# Patient Record
Sex: MALE | Race: WHITE | NOT HISPANIC OR LATINO | Employment: OTHER | URBAN - METROPOLITAN AREA
[De-identification: names, ages, dates, MRNs, and addresses within clinical notes are randomized per-mention and may not be internally consistent; named-entity substitution may affect disease eponyms.]

---

## 2023-04-09 PROCEDURE — G0179 MD RECERTIFICATION HHA PT: HCPCS | Performed by: INTERNAL MEDICINE

## 2023-07-24 ENCOUNTER — OFFICE VISIT (OUTPATIENT)
Dept: PRIMARY CARE | Facility: CLINIC | Age: 78
End: 2023-07-24
Payer: MEDICARE

## 2023-07-24 VITALS — BODY MASS INDEX: 32.76 KG/M2 | TEMPERATURE: 97.7 F | WEIGHT: 178 LBS | HEIGHT: 62 IN

## 2023-07-24 DIAGNOSIS — E78.5 HYPERLIPIDEMIA, UNSPECIFIED HYPERLIPIDEMIA TYPE: ICD-10-CM

## 2023-07-24 DIAGNOSIS — R53.83 MALAISE AND FATIGUE: ICD-10-CM

## 2023-07-24 DIAGNOSIS — E11.9 DIABETES TYPE 2, NO OCULAR INVOLVEMENT (MULTI): Primary | ICD-10-CM

## 2023-07-24 DIAGNOSIS — C18.9 MALIGNANT NEOPLASM OF COLON, UNSPECIFIED PART OF COLON (MULTI): ICD-10-CM

## 2023-07-24 DIAGNOSIS — R53.81 MALAISE AND FATIGUE: ICD-10-CM

## 2023-07-24 DIAGNOSIS — E11.69 TYPE 2 DIABETES MELLITUS WITH OTHER SPECIFIED COMPLICATION, WITHOUT LONG-TERM CURRENT USE OF INSULIN (MULTI): ICD-10-CM

## 2023-07-24 DIAGNOSIS — E55.9 VITAMIN D DEFICIENCY: ICD-10-CM

## 2023-07-24 LAB
CARCINOEMBRYONIC AG (NG/ML) IN SER/PLAS: 0.7 UG/L
CREAT UR STRIP-MCNC: 200 MG/DL
MICROALBUMIN UR TEST STR-MCNC: 30 MG/L
PROT/CREAT UR: <30 UG/MG CREAT

## 2023-07-24 PROCEDURE — 1159F MED LIST DOCD IN RCRD: CPT | Performed by: INTERNAL MEDICINE

## 2023-07-24 PROCEDURE — 99213 OFFICE O/P EST LOW 20 MIN: CPT | Performed by: INTERNAL MEDICINE

## 2023-07-24 PROCEDURE — 80053 COMPREHEN METABOLIC PANEL: CPT | Performed by: INTERNAL MEDICINE

## 2023-07-24 PROCEDURE — 84443 ASSAY THYROID STIM HORMONE: CPT | Performed by: INTERNAL MEDICINE

## 2023-07-24 PROCEDURE — 82570 ASSAY OF URINE CREATININE: CPT | Performed by: INTERNAL MEDICINE

## 2023-07-24 PROCEDURE — 1126F AMNT PAIN NOTED NONE PRSNT: CPT | Performed by: INTERNAL MEDICINE

## 2023-07-24 PROCEDURE — 82306 VITAMIN D 25 HYDROXY: CPT | Performed by: INTERNAL MEDICINE

## 2023-07-24 PROCEDURE — 1160F RVW MEDS BY RX/DR IN RCRD: CPT | Performed by: INTERNAL MEDICINE

## 2023-07-24 PROCEDURE — 83036 HEMOGLOBIN GLYCOSYLATED A1C: CPT | Performed by: INTERNAL MEDICINE

## 2023-07-24 PROCEDURE — 82378 CARCINOEMBRYONIC ANTIGEN: CPT

## 2023-07-24 PROCEDURE — 82043 UR ALBUMIN QUANTITATIVE: CPT | Performed by: INTERNAL MEDICINE

## 2023-07-24 PROCEDURE — 1036F TOBACCO NON-USER: CPT | Performed by: INTERNAL MEDICINE

## 2023-07-24 PROCEDURE — 85025 COMPLETE CBC W/AUTO DIFF WBC: CPT | Performed by: INTERNAL MEDICINE

## 2023-07-24 RX ORDER — BRIMONIDINE TARTRATE 2 MG/ML
1 SOLUTION/ DROPS OPHTHALMIC 2 TIMES DAILY
COMMUNITY

## 2023-07-24 RX ORDER — HYDROCORTISONE 25 MG/G
CREAM TOPICAL
COMMUNITY
Start: 2020-06-24

## 2023-07-24 RX ORDER — METOPROLOL TARTRATE 25 MG/1
25 TABLET, FILM COATED ORAL 2 TIMES DAILY
COMMUNITY
End: 2023-11-21 | Stop reason: SDUPTHER

## 2023-07-24 RX ORDER — MONTELUKAST SODIUM 10 MG/1
10 TABLET ORAL NIGHTLY
COMMUNITY

## 2023-07-24 RX ORDER — LATANOPROST 50 UG/ML
SOLUTION/ DROPS OPHTHALMIC
COMMUNITY

## 2023-07-24 RX ORDER — ATORVASTATIN CALCIUM 40 MG/1
40 TABLET, FILM COATED ORAL DAILY
COMMUNITY
End: 2023-09-20 | Stop reason: SDUPTHER

## 2023-07-24 RX ORDER — FINASTERIDE 5 MG/1
5 TABLET, FILM COATED ORAL DAILY
COMMUNITY
End: 2023-09-20 | Stop reason: SDUPTHER

## 2023-07-24 RX ORDER — BUDESONIDE AND FORMOTEROL FUMARATE DIHYDRATE 160; 4.5 UG/1; UG/1
2 AEROSOL RESPIRATORY (INHALATION) 2 TIMES DAILY
COMMUNITY

## 2023-07-24 RX ORDER — TAMSULOSIN HYDROCHLORIDE 0.4 MG/1
0.4 CAPSULE ORAL EVERY EVENING
COMMUNITY
End: 2023-09-20 | Stop reason: SDUPTHER

## 2023-07-24 RX ORDER — METOPROLOL TARTRATE 50 MG/1
25 TABLET ORAL 2 TIMES DAILY
COMMUNITY
Start: 2014-09-29 | End: 2023-11-21 | Stop reason: SDUPTHER

## 2023-07-24 RX ORDER — METFORMIN HYDROCHLORIDE 1000 MG/1
1000 TABLET ORAL 2 TIMES DAILY
COMMUNITY
End: 2023-09-20 | Stop reason: SDUPTHER

## 2023-07-24 RX ORDER — AMMONIUM LACTATE 12 G/100G
CREAM TOPICAL
COMMUNITY

## 2023-07-24 RX ORDER — ALBUTEROL SULFATE 90 UG/1
AEROSOL, METERED RESPIRATORY (INHALATION)
COMMUNITY

## 2023-07-24 RX ORDER — CICLOPIROX 7.7 MG/G
GEL TOPICAL
COMMUNITY
Start: 2022-06-30

## 2023-07-24 RX ORDER — VALSARTAN 80 MG/1
80 TABLET ORAL DAILY
COMMUNITY
End: 2023-09-20 | Stop reason: SDUPTHER

## 2023-07-24 RX ORDER — CLOTRIMAZOLE 1 %
CREAM (GRAM) TOPICAL
COMMUNITY
Start: 2022-12-28

## 2023-07-24 RX ORDER — GLIMEPIRIDE 4 MG/1
4 TABLET ORAL 2 TIMES DAILY
COMMUNITY
End: 2023-09-20 | Stop reason: SDUPTHER

## 2023-07-24 ASSESSMENT — PAIN SCALES - GENERAL: PAINLEVEL: 0-NO PAIN

## 2023-07-24 ASSESSMENT — COLUMBIA-SUICIDE SEVERITY RATING SCALE - C-SSRS
2. HAVE YOU ACTUALLY HAD ANY THOUGHTS OF KILLING YOURSELF?: NO
6. HAVE YOU EVER DONE ANYTHING, STARTED TO DO ANYTHING, OR PREPARED TO DO ANYTHING TO END YOUR LIFE?: NO
1. IN THE PAST MONTH, HAVE YOU WISHED YOU WERE DEAD OR WISHED YOU COULD GO TO SLEEP AND NOT WAKE UP?: NO

## 2023-07-24 ASSESSMENT — ENCOUNTER SYMPTOMS
OCCASIONAL FEELINGS OF UNSTEADINESS: 0
DEPRESSION: 0
LOSS OF SENSATION IN FEET: 0

## 2023-07-24 ASSESSMENT — PATIENT HEALTH QUESTIONNAIRE - PHQ9
SUM OF ALL RESPONSES TO PHQ9 QUESTIONS 1 AND 2: 0
1. LITTLE INTEREST OR PLEASURE IN DOING THINGS: NOT AT ALL
2. FEELING DOWN, DEPRESSED OR HOPELESS: NOT AT ALL

## 2023-07-24 NOTE — PROGRESS NOTES
Subjective    Eliceo Murphy is a 78 y.o. male who presents for  follow up.  Weak.  Has malaise.  Patient has difficulty to ambulate.  Using cane.    HPI    This is a 78 years old gentleman with medical history of hypertension, hyperlipidemia, gastroesophageal reflux disease, diabetes type 2, postherpetic neuralgia, history of colon CA 2013, S/p colectomy, history of CVA with right sided hemiparesis, dysarthria, asthma, COPD.  Patient presented for follow-up.  He is in the room with his daughter and wife present.  According to the daughter, patient is taking medications as directed.  Stated, that has been having minimal ambulation.  Using cane.  Weak.    Encouraged to exercise more.  Patient is interested to have his blood work done.  No shortness of breath or chest pain.  Does not check his blood glucose.     Still has weakness of his right side.  Taking medications as directed.  Blood pressure is stable.     Review of Systems   Constitutional:  Positive for activity change.   Cardiovascular:  Positive for chest pain.   Musculoskeletal:  Positive for arthralgias and back pain.   Neurological:  Positive for weakness.   Psychiatric/Behavioral:  The patient is nervous/anxious.        Objective        Vitals:    07/24/23 1442   Temp: 36.5 °C (97.7 °F)        Physical Exam  Constitutional:       Appearance: Normal appearance.   HENT:      Head: Normocephalic.      Nose: Nose normal.   Cardiovascular:      Rate and Rhythm: Regular rhythm.      Pulses: Normal pulses.   Abdominal:      Palpations: Abdomen is soft.   Musculoskeletal:         General: Normal range of motion.      Cervical back: Normal range of motion.   Skin:     General: Skin is warm.   Neurological:      General: No focal deficit present.      Mental Status: He is alert.         Diabetic  foot exam:  Good pulses, intact skin.    Diagnoses and all orders for this visit:  Diabetes type 2, no ocular involvement (CMS/HCC) (Primary)  -     POCT ALBUMIN RANDOM  URINE DOCKED DEVICE  -     Hemoglobin A1C  Hyperlipidemia, unspecified hyperlipidemia type  -     Comprehensive Metabolic Panel  Malignant neoplasm of colon, unspecified part of colon (CMS/HCC)  -     Carcinoembryonic Antigen  Type 2 diabetes mellitus with other specified complication, without long-term current use of insulin (CMS/HCC)  -     Cancel: POCT ALBUMIN RANDOM URINE DOCKED DEVICE  Malaise and fatigue  -     CBC  -     Thyroid Stimulating Hormone  Vitamin D deficiency  -     Vitamin D, Total  Other orders  -     POCT MICROALBUMIN RANDOM URINE  -     POCT MICROALBUMIN RANDOM URINE  -     POCT URINALYSIS AUTOMATED     -Diabetes type 2.  Keep 1800 ADA low-sodium low-cholesterol diet.  Taking glimepiride, metformin.  BW with hemoglobin A1c, urine microalbumin.  Requisition is given to the patient.     - HTN.  Well controlled.  Continue to take Current medications.  Avoid salt, exercise.     -Hyperlipidemia.  Continue to take atorvastatin.  Keep Mediterranean diet, exercise.  Return for fasting blood work.  Your LDL has to be less than 70.     - Asthma.  Followed by pulmonary.  Continuous cardiorespiratory rehabilitation.  Continue to use albuterol, Symbicort.  Using prednisone with exacerbation.     -BPH.  Continue to take finasteride, tamsulosin.  Follow-up with urology.     -History of metastatic colon CA 2013.  Patient had surgery done at [facility].  Will obtain all records.  In need to repeat colonoscopy.  CEA is today.     -Health maintenance.  Medicare wellness annual exam is up to date.  All vaccination is up-to-date.  Colonoscopy record is pending.     - S/p CVA with right-sided weakness, dysphagia.  Patient had slow recovery.  Encourage ambulation, exercise.     - Class 1 Obesity with 32.56  Diet, exercise.  Ideal BMI is less than 25.   opth 8/2023     Holly Mcarthur MD

## 2023-07-25 LAB
APPEARANCE UR: CLEAR
BILIRUB UR QL STRIP: NEGATIVE
COLOR UR: YELLOW
CREAT UR STRIP-MCNC: 50 MG/DL
GLUCOSE UR STRIP-MCNC: NEGATIVE MG/DL
HGB UR QL STRIP: NEGATIVE
KETONES UR STRIP-MCNC: NEGATIVE MG/DL
LEUKOCYTE ESTERASE UR QL STRIP: NEGATIVE
MICROALBUMIN UR TEST STR-MCNC: 30 MG/L
NITRITE UR QL STRIP: NEGATIVE
PH UR STRIP: 6 [PH]
PROT UR STRIP-MCNC: NEGATIVE MG/DL
PROT/CREAT UR: ABNORMAL UG/MG CREAT
SP GR UR STRIP.AUTO: 1.02
UROBILINOGEN UR STRIP-ACNC: 0.2 E.U./DL

## 2023-07-25 PROCEDURE — 82570 ASSAY OF URINE CREATININE: CPT | Performed by: INTERNAL MEDICINE

## 2023-07-25 PROCEDURE — 82043 UR ALBUMIN QUANTITATIVE: CPT | Performed by: INTERNAL MEDICINE

## 2023-07-25 PROCEDURE — 81003 URINALYSIS AUTO W/O SCOPE: CPT | Performed by: INTERNAL MEDICINE

## 2023-07-25 ASSESSMENT — ENCOUNTER SYMPTOMS
ARTHRALGIAS: 1
BACK PAIN: 1
NERVOUS/ANXIOUS: 1
ACTIVITY CHANGE: 1
WEAKNESS: 1

## 2023-08-07 PROCEDURE — G0179 MD RECERTIFICATION HHA PT: HCPCS | Performed by: INTERNAL MEDICINE

## 2023-09-19 ENCOUNTER — TELEPHONE (OUTPATIENT)
Dept: PRIMARY CARE | Facility: CLINIC | Age: 78
End: 2023-09-19

## 2023-09-19 NOTE — TELEPHONE ENCOUNTER
Medication refill  Finasterde- mg 5 - 90 quantity 3 refill  Atorvastatin- 40 mg- 90 days  Glimepirde- 4mg -90 days   Tarmsolosin .4 mg 90 days  Valsartan- 80 mg -90 days  Metformin- 1000mg - 90 days  Januvia- 100mg, 90 days  [facility]- [phone]   Pharmacy they sent this list in twice nothing was refilled or sent to [facility]

## 2023-09-20 DIAGNOSIS — I10 PRIMARY HYPERTENSION: ICD-10-CM

## 2023-09-20 DIAGNOSIS — N40.0 BENIGN PROSTATIC HYPERPLASIA, UNSPECIFIED WHETHER LOWER URINARY TRACT SYMPTOMS PRESENT: ICD-10-CM

## 2023-09-20 DIAGNOSIS — E78.5 HYPERLIPIDEMIA, UNSPECIFIED HYPERLIPIDEMIA TYPE: ICD-10-CM

## 2023-09-20 DIAGNOSIS — E11.9 DIABETES TYPE 2, NO OCULAR INVOLVEMENT (MULTI): Primary | ICD-10-CM

## 2023-09-20 RX ORDER — VALSARTAN 80 MG/1
80 TABLET ORAL DAILY
Qty: 90 TABLET | Refills: 2 | Status: SHIPPED | OUTPATIENT
Start: 2023-09-20 | End: 2024-04-05

## 2023-09-20 RX ORDER — ATORVASTATIN CALCIUM 40 MG/1
40 TABLET, FILM COATED ORAL DAILY
Qty: 90 TABLET | Refills: 2 | Status: SHIPPED | OUTPATIENT
Start: 2023-09-20 | End: 2024-04-05

## 2023-09-20 RX ORDER — TAMSULOSIN HYDROCHLORIDE 0.4 MG/1
0.4 CAPSULE ORAL EVERY EVENING
Qty: 90 CAPSULE | Refills: 2 | Status: SHIPPED | OUTPATIENT
Start: 2023-09-20 | End: 2023-12-19

## 2023-09-20 RX ORDER — FINASTERIDE 5 MG/1
5 TABLET, FILM COATED ORAL DAILY
Qty: 90 TABLET | Refills: 3 | Status: SHIPPED | OUTPATIENT
Start: 2023-09-20 | End: 2023-12-19

## 2023-09-20 RX ORDER — GLIMEPIRIDE 4 MG/1
4 TABLET ORAL 2 TIMES DAILY
Qty: 180 TABLET | Refills: 2 | Status: SHIPPED | OUTPATIENT
Start: 2023-09-20 | End: 2024-04-05

## 2023-09-20 RX ORDER — METFORMIN HYDROCHLORIDE 1000 MG/1
1000 TABLET ORAL 2 TIMES DAILY
Qty: 180 TABLET | Refills: 3 | Status: SHIPPED | OUTPATIENT
Start: 2023-09-20 | End: 2024-04-05

## 2023-10-06 PROCEDURE — G0179 MD RECERTIFICATION HHA PT: HCPCS | Performed by: INTERNAL MEDICINE

## 2023-10-30 ENCOUNTER — OFFICE VISIT (OUTPATIENT)
Dept: PRIMARY CARE | Facility: CLINIC | Age: 78
End: 2023-10-30
Payer: MEDICARE

## 2023-10-30 VITALS
SYSTOLIC BLOOD PRESSURE: 122 MMHG | DIASTOLIC BLOOD PRESSURE: 62 MMHG | BODY MASS INDEX: 32.01 KG/M2 | HEART RATE: 64 BPM | TEMPERATURE: 96.8 F | WEIGHT: 175 LBS | RESPIRATION RATE: 16 BRPM

## 2023-10-30 DIAGNOSIS — R53.83 MALAISE AND FATIGUE: ICD-10-CM

## 2023-10-30 DIAGNOSIS — E11.9 DIABETES TYPE 2, NO OCULAR INVOLVEMENT (MULTI): Primary | ICD-10-CM

## 2023-10-30 DIAGNOSIS — R53.81 MALAISE AND FATIGUE: ICD-10-CM

## 2023-10-30 DIAGNOSIS — E78.5 HYPERLIPIDEMIA, UNSPECIFIED HYPERLIPIDEMIA TYPE: ICD-10-CM

## 2023-10-30 DIAGNOSIS — E55.9 VITAMIN D DEFICIENCY: ICD-10-CM

## 2023-10-30 PROCEDURE — G0008 ADMIN INFLUENZA VIRUS VAC: HCPCS | Performed by: INTERNAL MEDICINE

## 2023-10-30 PROCEDURE — 1159F MED LIST DOCD IN RCRD: CPT | Performed by: INTERNAL MEDICINE

## 2023-10-30 PROCEDURE — 99213 OFFICE O/P EST LOW 20 MIN: CPT | Performed by: INTERNAL MEDICINE

## 2023-10-30 PROCEDURE — 90662 IIV NO PRSV INCREASED AG IM: CPT | Performed by: INTERNAL MEDICINE

## 2023-10-30 PROCEDURE — 1160F RVW MEDS BY RX/DR IN RCRD: CPT | Performed by: INTERNAL MEDICINE

## 2023-10-30 PROCEDURE — 1036F TOBACCO NON-USER: CPT | Performed by: INTERNAL MEDICINE

## 2023-10-30 PROCEDURE — 1126F AMNT PAIN NOTED NONE PRSNT: CPT | Performed by: INTERNAL MEDICINE

## 2023-10-30 RX ORDER — DORZOLAMIDE HYDROCHLORIDE AND TIMOLOL MALEATE 20; 5 MG/ML; MG/ML
1 SOLUTION/ DROPS OPHTHALMIC 2 TIMES DAILY
COMMUNITY
Start: 2023-10-20

## 2023-10-30 RX ORDER — ASPIRIN 81 MG/1
81 TABLET ORAL
COMMUNITY

## 2023-10-30 ASSESSMENT — PAIN SCALES - GENERAL: PAINLEVEL: 0-NO PAIN

## 2023-10-30 ASSESSMENT — ENCOUNTER SYMPTOMS
LIGHT-HEADEDNESS: 1
APPETITE CHANGE: 1
ACTIVITY CHANGE: 1
CHEST TIGHTNESS: 0
SHORTNESS OF BREATH: 0
BACK PAIN: 1

## 2023-10-30 NOTE — PROGRESS NOTES
Subjective    Eliceo Murphy is a 78 y.o. male who presents for  follow up.  Weak.  Has unsteady gait, using cane.    HPI    This is a 78 years old gentleman with medical history of hypertension, hyperlipidemia, gastroesophageal reflux disease, diabetes type 2, postherpetic neuralgia, history of colon CA 2013, S/p colectomy, history of CVA with right sided hemiparesis, dysarthria, asthma, COPD.  Patient is presented for follow-up.  Has unsteady gait, using cane.   Has minimal ambulation.  Using cane.  Weak.   Encouraged to exercise more.  No shortness of breath or chest pain.  Does not check his blood glucose.     Still has weakness of his right side.  Taking medications as directed.  Blood pressure is stable.     Review of Systems   Constitutional:  Positive for activity change and appetite change.   HENT:  Negative for congestion.    Respiratory:  Negative for chest tightness and shortness of breath.    Musculoskeletal:  Positive for back pain.   Neurological:  Positive for light-headedness.       Objective        Vitals:    10/30/23 1552   Temp: 36 °C (96.8 °F)        Physical Exam  HENT:      Head: Normocephalic.      Nose: Nose normal.   Cardiovascular:      Pulses: Normal pulses.   Pulmonary:      Breath sounds: Normal breath sounds.   Abdominal:      Palpations: Abdomen is soft.   Musculoskeletal:         General: Tenderness present.   Skin:     General: Skin is warm.   Neurological:      General: No focal deficit present.      Mental Status: He is alert. Mental status is at baseline.       Diagnoses and all orders for this visit:  Diabetes type 2, no ocular involvement (CMS/[facility]) (Primary)  Hyperlipidemia, unspecified hyperlipidemia type  Malaise and fatigue  Vitamin D deficiency  Other orders  -     Flu vaccine, quadrivalent, high-dose, preservative free, age 65y+ (FLUZONE)     -Diabetes type 2.  Keep 1800 ADA low-sodium low-cholesterol diet.  Taking glimepiride, metformin.   Return for fasting blood work.      - HTN.  Well controlled.  Continue to take Current medications.  Avoid salt, exercise.     -Hyperlipidemia.  Continue to take atorvastatin.  Keep Mediterranean diet, exercise.  Return for fasting blood work.  Your LDL has to be less than 70.     - Asthma.  Followed by pulmonary.  Continuous cardiorespiratory rehabilitation.  Continue to use albuterol, Symbicort.  Using prednisone with exacerbation.     -BPH.  Continue to take finasteride, tamsulosin.  Follow-up with urology.     -History of metastatic colon CA 2013.  Patient had surgery done at [facility].  Will obtain all records.  In need to repeat colonoscopy.  CEA is today.     -Health maintenance.  Medicare wellness annual exam is up to date.  All vaccination is up-to-date.  Colonoscopy record is pending.     - S/p CVA with right-sided weakness, dysphagia.  Patient had slow recovery.  Encourage ambulation, exercise.     - Class 1 Obesity with 32.01  Diet, exercise.  Ideal BMI is less than 25.   opth 8/2023      Holly Mcarthur MD

## 2023-10-30 NOTE — PROGRESS NOTES
Subjective    Eliceo Murphy is a 78 y.o. male who presents for        HPI  This is a 78 years old gentleman with medical history of hypertension, hyperlipidemia, gastroesophageal reflux disease, diabetes type 2, postherpetic neuralgia, history of colon CA 2013, S/p colectomy, history of CVA with right sided hemiparesis, dysarthria, asthma, COPD.  Patient presented for follow-up.  He is in the room with his daughter and wife present.  According to the daughter, patient is taking medications as directed.  Stated, that has been having minimal ambulation.  Using cane.  Weak.     Encouraged to exercise more.  Patient is interested to have his blood work done.  No shortness of breath or chest pain.  Does not check his blood glucose.     Still has weakness of his right side.  Taking medications as directed.  Blood pressure is stable.     Review of Systems    Objective        Vitals:    10/30/23 1552   Temp: 36 °C (96.8 °F)        Physical Exam  There are no diagnoses linked to this encounter.       -Diabetes type 2.  Keep 1800 ADA low-sodium low-cholesterol diet.  Taking glimepiride, metformin.  BW with hemoglobin A1c, urine microalbumin.  Requisition is given to the patient.     - HTN.  Well controlled.  Continue to take Current medications.  Avoid salt, exercise.     -Hyperlipidemia.  Continue to take atorvastatin.  Keep Mediterranean diet, exercise.  Return for fasting blood work.  Your LDL has to be less than 70.     - Asthma.  Followed by pulmonary.  Continuous cardiorespiratory rehabilitation.  Continue to use albuterol, Symbicort.  Using prednisone with exacerbation.     -BPH.  Continue to take finasteride, tamsulosin.  Follow-up with urology.     -History of metastatic colon CA 2013.  Patient had surgery done at [facility].  Will obtain all records.  In need to repeat colonoscopy.  CEA is today.     -Health maintenance.  Medicare wellness annual exam is up to date.  All vaccination is up-to-date.  Colonoscopy  record is pending.     - S/p CVA with right-sided weakness, dysphagia.  Patient had slow recovery.  Encourage ambulation, exercise.     - Class 1 Obesity with 32.01  Diet, exercise.  Ideal BMI is less than 25.   opth 8/2023      Holly Mcarthur MD

## 2023-11-21 ENCOUNTER — TELEPHONE (OUTPATIENT)
Dept: PRIMARY CARE | Facility: CLINIC | Age: 78
End: 2023-11-21

## 2023-11-21 DIAGNOSIS — I10 HYPERTENSION, UNSPECIFIED TYPE: ICD-10-CM

## 2023-11-21 RX ORDER — METOPROLOL TARTRATE 25 MG/1
25 TABLET, FILM COATED ORAL 2 TIMES DAILY
Qty: 180 TABLET | Refills: 3 | Status: SHIPPED | OUTPATIENT
Start: 2023-11-21 | End: 2024-04-05

## 2023-11-21 RX ORDER — METOPROLOL TARTRATE 25 MG/1
25 TABLET, FILM COATED ORAL 2 TIMES DAILY
Qty: 180 TABLET | Refills: 0 | Status: SHIPPED | OUTPATIENT
Start: 2023-11-21 | End: 2023-11-21 | Stop reason: SDUPTHER

## 2023-11-24 ENCOUNTER — TELEPHONE (OUTPATIENT)
Dept: PHARMACY | Facility: HOSPITAL | Age: 78
End: 2023-11-24
Payer: MEDICARE

## 2023-11-24 NOTE — TELEPHONE ENCOUNTER
Population Health: Outreach by Ambulatory Pharmacy Team    Payor: Shemar BALLESTEROS  Reason: Adherence  Medication: Atorvastatin and valsartan  Outcome: Patient Reached: Claims Adherence, Spoke with daughter who states patient is adherent; gets 90 days ; looking at dispense history, patient has been on time with his last refills    Sandie ePrera, YinkaD

## 2024-02-03 PROCEDURE — G0179 MD RECERTIFICATION HHA PT: HCPCS | Performed by: INTERNAL MEDICINE

## 2024-03-22 ENCOUNTER — APPOINTMENT (OUTPATIENT)
Dept: PRIMARY CARE | Facility: CLINIC | Age: 79
End: 2024-03-22
Payer: MEDICARE

## 2024-04-03 PROCEDURE — G0179 MD RECERTIFICATION HHA PT: HCPCS | Performed by: INTERNAL MEDICINE

## 2024-04-05 ENCOUNTER — OFFICE VISIT (OUTPATIENT)
Dept: PRIMARY CARE | Facility: CLINIC | Age: 79
End: 2024-04-05
Payer: MEDICARE

## 2024-04-05 VITALS
HEIGHT: 61 IN | DIASTOLIC BLOOD PRESSURE: 62 MMHG | HEART RATE: 64 BPM | SYSTOLIC BLOOD PRESSURE: 132 MMHG | RESPIRATION RATE: 16 BRPM | BODY MASS INDEX: 32.47 KG/M2 | TEMPERATURE: 97.7 F | WEIGHT: 172 LBS

## 2024-04-05 DIAGNOSIS — E53.8 VITAMIN B12 DEFICIENCY: ICD-10-CM

## 2024-04-05 DIAGNOSIS — I63.9 RIGHT-SIDED CEREBROVASCULAR ACCIDENT (CVA) (MULTI): ICD-10-CM

## 2024-04-05 DIAGNOSIS — M54.50 LOW BACK PAIN, UNSPECIFIED BACK PAIN LATERALITY, UNSPECIFIED CHRONICITY, UNSPECIFIED WHETHER SCIATICA PRESENT: ICD-10-CM

## 2024-04-05 DIAGNOSIS — R53.83 MALAISE AND FATIGUE: ICD-10-CM

## 2024-04-05 DIAGNOSIS — N40.0 BENIGN PROSTATIC HYPERPLASIA, UNSPECIFIED WHETHER LOWER URINARY TRACT SYMPTOMS PRESENT: ICD-10-CM

## 2024-04-05 DIAGNOSIS — R53.81 MALAISE AND FATIGUE: ICD-10-CM

## 2024-04-05 DIAGNOSIS — E11.9 DIABETES TYPE 2, NO OCULAR INVOLVEMENT (MULTI): Primary | ICD-10-CM

## 2024-04-05 DIAGNOSIS — Z00.00 ROUTINE GENERAL MEDICAL EXAMINATION AT HEALTH CARE FACILITY: ICD-10-CM

## 2024-04-05 DIAGNOSIS — E55.9 VITAMIN D DEFICIENCY: ICD-10-CM

## 2024-04-05 DIAGNOSIS — E78.5 HYPERLIPIDEMIA, UNSPECIFIED HYPERLIPIDEMIA TYPE: ICD-10-CM

## 2024-04-05 PROCEDURE — 1160F RVW MEDS BY RX/DR IN RCRD: CPT | Performed by: INTERNAL MEDICINE

## 2024-04-05 PROCEDURE — 1157F ADVNC CARE PLAN IN RCRD: CPT | Performed by: INTERNAL MEDICINE

## 2024-04-05 PROCEDURE — 1036F TOBACCO NON-USER: CPT | Performed by: INTERNAL MEDICINE

## 2024-04-05 PROCEDURE — G0439 PPPS, SUBSEQ VISIT: HCPCS | Performed by: INTERNAL MEDICINE

## 2024-04-05 PROCEDURE — 99213 OFFICE O/P EST LOW 20 MIN: CPT | Performed by: INTERNAL MEDICINE

## 2024-04-05 PROCEDURE — 1159F MED LIST DOCD IN RCRD: CPT | Performed by: INTERNAL MEDICINE

## 2024-04-05 PROCEDURE — 1170F FXNL STATUS ASSESSED: CPT | Performed by: INTERNAL MEDICINE

## 2024-04-05 RX ORDER — FLASH GLUCOSE SENSOR
KIT MISCELLANEOUS
Qty: 1 EACH | Refills: 0 | Status: SHIPPED | OUTPATIENT
Start: 2024-04-05

## 2024-04-05 RX ORDER — FLASH GLUCOSE SCANNING READER
EACH MISCELLANEOUS
Qty: 1 EACH | Refills: 0 | Status: SHIPPED | OUTPATIENT
Start: 2024-04-05 | End: 2024-04-19

## 2024-04-05 ASSESSMENT — ACTIVITIES OF DAILY LIVING (ADL)
EATING: INDEPENDENT
FEEDING YOURSELF: NEEDS ASSISTANCE
DRESSING: INDEPENDENT
BATHING: NEEDS ASSISTANCE
PILL BOX USED: YES
ADEQUATE_TO_COMPLETE_ADL: YES
USING TELEPHONE: TOTAL CARE
JUDGMENT_ADEQUATE_SAFELY_COMPLETE_DAILY_ACTIVITIES: NO
JUDGMENT_ADEQUATE_SAFELY_COMPLETE_DAILY_ACTIVITIES: YES
GROOMING: INDEPENDENT
TAKING MEDICATION: TOTAL CARE
DOING HOUSEWORK: TOTAL CARE
ASSISTIVE_DEVICE: CANE;WALKER
TOILETING: NEEDS ASSISTANCE
DRESSING YOURSELF: INDEPENDENT
NEEDS ASSISTANCE WITH FOOD: INDEPENDENT
PREPARING MEALS: TOTAL CARE
STIL DRIVING: NO
HEARING - LEFT EAR: FUNCTIONAL
FEEDING: NEEDS ASSISTANCE
HEARING - RIGHT EAR: FUNCTIONAL
GROCERY SHOPPING: TOTAL CARE
TOILETING: INDEPENDENT
BATHING: NEEDS ASSISTANCE
WALKS IN HOME: INDEPENDENT
MANAGING FINANCES: TOTAL CARE
PATIENT'S MEMORY ADEQUATE TO SAFELY COMPLETE DAILY ACTIVITIES?: NO
USING TRANSPORTATION: TOTAL CARE
ADEQUATE_TO_COMPLETE_ADL: YES

## 2024-04-05 ASSESSMENT — ANXIETY QUESTIONNAIRES
6. BECOMING EASILY ANNOYED OR IRRITABLE: SEVERAL DAYS
7. FEELING AFRAID AS IF SOMETHING AWFUL MIGHT HAPPEN: SEVERAL DAYS
IF YOU CHECKED OFF ANY PROBLEMS ON THIS QUESTIONNAIRE, HOW DIFFICULT HAVE THESE PROBLEMS MADE IT FOR YOU TO DO YOUR WORK, TAKE CARE OF THINGS AT HOME, OR GET ALONG WITH OTHER PEOPLE: SOMEWHAT DIFFICULT
3. WORRYING TOO MUCH ABOUT DIFFERENT THINGS: SEVERAL DAYS
4. TROUBLE RELAXING: SEVERAL DAYS
1. FEELING NERVOUS, ANXIOUS, OR ON EDGE: SEVERAL DAYS
5. BEING SO RESTLESS THAT IT IS HARD TO SIT STILL: SEVERAL DAYS
GAD7 TOTAL SCORE: 7
2. NOT BEING ABLE TO STOP OR CONTROL WORRYING: SEVERAL DAYS

## 2024-04-05 ASSESSMENT — ENCOUNTER SYMPTOMS
LOSS OF SENSATION IN FEET: 1
ARTHRALGIAS: 1
DEPRESSION: 1
NERVOUS/ANXIOUS: 1
ABDOMINAL DISTENTION: 0
BACK PAIN: 1
ACTIVITY CHANGE: 1
APPETITE CHANGE: 1
OCCASIONAL FEELINGS OF UNSTEADINESS: 1
FATIGUE: 1

## 2024-04-05 ASSESSMENT — COLUMBIA-SUICIDE SEVERITY RATING SCALE - C-SSRS
1. IN THE PAST MONTH, HAVE YOU WISHED YOU WERE DEAD OR WISHED YOU COULD GO TO SLEEP AND NOT WAKE UP?: NO
6. HAVE YOU EVER DONE ANYTHING, STARTED TO DO ANYTHING, OR PREPARED TO DO ANYTHING TO END YOUR LIFE?: NO
2. HAVE YOU ACTUALLY HAD ANY THOUGHTS OF KILLING YOURSELF?: NO

## 2024-04-05 ASSESSMENT — GERIATRIC MINI NUTRITIONAL ASSESSMENT (MNA)
B WEIGHT LOSS DURING THE LAST 3 MONTHS: NO WEIGHT LOSS
C GENERAL MOBILITY: GOES OUT
A HAS FOOD INTAKE DECLINED OVER THE PAST 3 MONTHS DUE TO LOSS OF APPETITE, DIGESTIVE PROBLEMS, CHEWING OR SWALLOWING DIFFICULTIES?: NO DECREASE IN FOOD INTAKE

## 2024-04-05 ASSESSMENT — PATIENT HEALTH QUESTIONNAIRE - PHQ9
2. FEELING DOWN, DEPRESSED OR HOPELESS: SEVERAL DAYS
1. LITTLE INTEREST OR PLEASURE IN DOING THINGS: SEVERAL DAYS
10. IF YOU CHECKED OFF ANY PROBLEMS, HOW DIFFICULT HAVE THESE PROBLEMS MADE IT FOR YOU TO DO YOUR WORK, TAKE CARE OF THINGS AT HOME, OR GET ALONG WITH OTHER PEOPLE: SOMEWHAT DIFFICULT
SUM OF ALL RESPONSES TO PHQ9 QUESTIONS 1 AND 2: 2

## 2024-04-05 NOTE — PROGRESS NOTES
Subjective    Eliceo Murphy is a 79 y.o. male who presents for Medicare Wellness Annual exam.  Patient is weak, has minimal ambulation.  Using cane.  Interested to have BW done.    HPI    This is a 79 years old gentleman with medical history of hypertension, hyperlipidemia, gastroesophageal reflux disease, diabetes type 2, postherpetic neuralgia, history of colon CA 2013, S/p colectomy, history of CVA with right sided hemiparesis, dysarthria, asthma, COPD.  Patient is presented for follow-up.  He is here for Medicare Wellness Annual exam.  Blood glucose is not well controlled.    Has unsteady gait, using cane.   Has minimal ambulation.  Using cane.  Weak.   Encouraged to exercise more.  No shortness of breath or chest pain.  Does not check his blood glucose.     Still has weakness of his right side.  Taking medications as directed.  Blood pressure is stable.    Review of Systems   Constitutional:  Positive for activity change, appetite change and fatigue.   Gastrointestinal:  Negative for abdominal distention.   Musculoskeletal:  Positive for arthralgias and back pain.   Psychiatric/Behavioral:  The patient is nervous/anxious.        Objective        Vitals:    04/05/24 1417   BP: 132/62   Pulse: 64   Resp: 16   Temp: 36.5 °C (97.7 °F)        Physical Exam  HENT:      Head: Normocephalic.      Mouth/Throat:      Mouth: Mucous membranes are moist.   Eyes:      Extraocular Movements: Extraocular movements intact.   Cardiovascular:      Rate and Rhythm: Normal rate.      Heart sounds: Normal heart sounds.   Pulmonary:      Breath sounds: Normal breath sounds.   Abdominal:      Palpations: Abdomen is soft.   Musculoskeletal:         General: Normal range of motion.   Skin:     General: Skin is warm.   Neurological:      Mental Status: He is alert. Mental status is at baseline.   Psychiatric:         Mood and Affect: Mood normal.       Diagnoses and all orders for this visit:  Diabetes type 2, no ocular involvement  (CMS/[facility]) (Primary)  -     Hemoglobin A1C; Future  -     Hemoglobin A1C  Vitamin D deficiency  -     Vitamin D 25-Hydroxy,Total (for eval of Vitamin D levels); Future  -     Vitamin D 25-Hydroxy,Total (for eval of Vitamin D levels)  Malaise and fatigue  -     CBC; Future  -     Thyroid Stimulating Hormone; Future  -     CBC  -     Thyroid Stimulating Hormone  Hyperlipidemia, unspecified hyperlipidemia type  -     Comprehensive Metabolic Panel; Future  -     Lipid Panel; Future  -     Comprehensive Metabolic Panel  -     Lipid Panel  Benign prostatic hyperplasia, unspecified whether lower urinary tract symptoms present  -     Prostate Specific Antigen; Future  -     Prostate Specific Antigen  Vitamin B12 deficiency  -     Vitamin B12; Future  -     Vitamin B12  Low back pain, unspecified back pain laterality, unspecified chronicity, unspecified whether sciatica present  Right-sided cerebrovascular accident (CVA) (CMS/[facility])  -     Referral to Physical Therapy; Future  Routine general medical examination at health care facility  -     1 Year Follow Up In Primary Care - Wellness Exam; Future       -Diabetes type 2.  Keep 1800 ADA low-sodium low-cholesterol diet.  Taking glimepiride, metformin.   Return for fasting blood work.     - HTN.  Well controlled.  Continue to take Current medications.  Avoid salt, exercise.     -Hyperlipidemia.  Continue to take atorvastatin.  Keep Mediterranean diet, exercise.  Return for fasting blood work.  Your LDL has to be less than 70.     - Asthma.  Followed by pulmonary.  Continuous cardiorespiratory rehabilitation.  Continue to use albuterol, Symbicort.  Using prednisone with exacerbation.     -BPH.  Continue to take finasteride, tamsulosin.  Follow-up with urology.     -History of metastatic colon CA 2013.  Patient had surgery done at [facility].  Will obtain all records.  In need to repeat colonoscopy.  CEA is today.     -Health maintenance.  Medicare wellness annual exam is   today.  All vaccination is up-to-date.  Colonoscopy record is pending.     - S/p CVA with right-sided weakness, dysphagia.  Patient had slow recovery.  Encourage ambulation, exercise.     - Class 1 Obesity with 32.50  Diet, exercise.  Ideal BMI is less than 25.      Holly Mcarthur MD

## 2024-04-18 DIAGNOSIS — E11.9 DIABETES TYPE 2, NO OCULAR INVOLVEMENT (MULTI): ICD-10-CM

## 2024-04-18 DIAGNOSIS — E03.9 HYPOTHYROIDISM, UNSPECIFIED TYPE: Primary | ICD-10-CM

## 2024-04-19 RX ORDER — FLASH GLUCOSE SCANNING READER
EACH MISCELLANEOUS
Qty: 2 EACH | Refills: 0 | Status: SHIPPED | OUTPATIENT
Start: 2024-04-19

## 2024-04-24 RX ORDER — DORZOLAMIDE HCL 20 MG/ML
1 SOLUTION/ DROPS OPHTHALMIC 2 TIMES DAILY
COMMUNITY
Start: 2024-03-13

## 2024-04-24 RX ORDER — TIMOLOL MALEATE 5 MG/ML
1 SOLUTION/ DROPS OPHTHALMIC 2 TIMES DAILY
COMMUNITY
Start: 2024-03-13

## 2024-04-24 RX ORDER — PREDNISOLONE ACETATE 10 MG/ML
1 SUSPENSION/ DROPS OPHTHALMIC 4 TIMES DAILY
COMMUNITY
Start: 2024-04-22

## 2024-04-24 RX ORDER — TRIAMCINOLONE ACETONIDE 1 MG/G
CREAM TOPICAL
COMMUNITY
Start: 2024-03-27

## 2024-04-24 RX ORDER — TAMSULOSIN HYDROCHLORIDE 0.4 MG/1
0.4 CAPSULE ORAL NIGHTLY
COMMUNITY
Start: 2024-03-27

## 2024-04-29 ENCOUNTER — APPOINTMENT (OUTPATIENT)
Dept: PRIMARY CARE | Facility: CLINIC | Age: 79
End: 2024-04-29
Payer: MEDICARE

## 2024-04-29 RX ORDER — LEVOTHYROXINE SODIUM 25 UG/1
25 TABLET ORAL DAILY
Qty: 90 TABLET | Refills: 2 | Status: SHIPPED | OUTPATIENT
Start: 2024-04-29 | End: 2024-07-28

## 2024-05-13 ENCOUNTER — APPOINTMENT (OUTPATIENT)
Dept: PRIMARY CARE | Facility: CLINIC | Age: 79
End: 2024-05-13
Payer: MEDICARE

## 2024-06-02 PROCEDURE — G0179 MD RECERTIFICATION HHA PT: HCPCS | Performed by: INTERNAL MEDICINE

## 2024-06-10 RX ORDER — MELOXICAM 15 MG/1
15 TABLET ORAL
Qty: 30 TABLET | Refills: 0 | OUTPATIENT
Start: 2024-06-10

## 2024-06-10 RX ORDER — MELOXICAM 15 MG/1
15 TABLET ORAL
COMMUNITY
Start: 2024-06-06

## 2024-06-17 ENCOUNTER — APPOINTMENT (OUTPATIENT)
Dept: PRIMARY CARE | Facility: CLINIC | Age: 79
End: 2024-06-17
Payer: MEDICARE

## 2024-06-17 VITALS
RESPIRATION RATE: 16 BRPM | HEART RATE: 64 BPM | BODY MASS INDEX: 31.74 KG/M2 | DIASTOLIC BLOOD PRESSURE: 62 MMHG | SYSTOLIC BLOOD PRESSURE: 122 MMHG | WEIGHT: 168 LBS | TEMPERATURE: 98.6 F

## 2024-06-17 DIAGNOSIS — I10 PRIMARY HYPERTENSION: ICD-10-CM

## 2024-06-17 DIAGNOSIS — E78.5 HYPERLIPIDEMIA, UNSPECIFIED HYPERLIPIDEMIA TYPE: ICD-10-CM

## 2024-06-17 DIAGNOSIS — I10 HYPERTENSION, UNSPECIFIED TYPE: ICD-10-CM

## 2024-06-17 DIAGNOSIS — R60.0 BILATERAL LEG EDEMA: ICD-10-CM

## 2024-06-17 DIAGNOSIS — E11.9 DIABETES TYPE 2, NO OCULAR INVOLVEMENT (MULTI): ICD-10-CM

## 2024-06-17 DIAGNOSIS — E03.9 HYPOTHYROIDISM, UNSPECIFIED TYPE: Primary | ICD-10-CM

## 2024-06-17 PROCEDURE — 99213 OFFICE O/P EST LOW 20 MIN: CPT | Performed by: INTERNAL MEDICINE

## 2024-06-17 PROCEDURE — 3074F SYST BP LT 130 MM HG: CPT | Performed by: INTERNAL MEDICINE

## 2024-06-17 PROCEDURE — 84443 ASSAY THYROID STIM HORMONE: CPT | Performed by: INTERNAL MEDICINE

## 2024-06-17 PROCEDURE — 3078F DIAST BP <80 MM HG: CPT | Performed by: INTERNAL MEDICINE

## 2024-06-17 PROCEDURE — 1126F AMNT PAIN NOTED NONE PRSNT: CPT | Performed by: INTERNAL MEDICINE

## 2024-06-17 PROCEDURE — 1160F RVW MEDS BY RX/DR IN RCRD: CPT | Performed by: INTERNAL MEDICINE

## 2024-06-17 PROCEDURE — 1036F TOBACCO NON-USER: CPT | Performed by: INTERNAL MEDICINE

## 2024-06-17 PROCEDURE — 1157F ADVNC CARE PLAN IN RCRD: CPT | Performed by: INTERNAL MEDICINE

## 2024-06-17 PROCEDURE — 1159F MED LIST DOCD IN RCRD: CPT | Performed by: INTERNAL MEDICINE

## 2024-06-17 RX ORDER — GLIMEPIRIDE 4 MG/1
4 TABLET ORAL 2 TIMES DAILY
Qty: 180 TABLET | Refills: 2 | Status: SHIPPED | OUTPATIENT
Start: 2024-06-17 | End: 2024-09-15

## 2024-06-17 RX ORDER — METOPROLOL TARTRATE 25 MG/1
25 TABLET, FILM COATED ORAL 2 TIMES DAILY
Qty: 180 TABLET | Refills: 3 | Status: SHIPPED | OUTPATIENT
Start: 2024-06-17 | End: 2024-09-15

## 2024-06-17 RX ORDER — ATORVASTATIN CALCIUM 40 MG/1
40 TABLET, FILM COATED ORAL DAILY
Qty: 90 TABLET | Refills: 2 | Status: SHIPPED | OUTPATIENT
Start: 2024-06-17 | End: 2024-09-15

## 2024-06-17 RX ORDER — VALSARTAN 80 MG/1
80 TABLET ORAL DAILY
Qty: 90 TABLET | Refills: 2 | Status: SHIPPED | OUTPATIENT
Start: 2024-06-17 | End: 2024-09-15

## 2024-06-17 RX ORDER — LIDOCAINE 560 MG/1
1 PATCH PERCUTANEOUS; TOPICAL; TRANSDERMAL
COMMUNITY
Start: 2024-05-14

## 2024-06-17 RX ORDER — METFORMIN HYDROCHLORIDE 1000 MG/1
1000 TABLET ORAL 2 TIMES DAILY
Qty: 180 TABLET | Refills: 3 | Status: SHIPPED | OUTPATIENT
Start: 2024-06-17 | End: 2024-09-15

## 2024-06-17 ASSESSMENT — ENCOUNTER SYMPTOMS
OCCASIONAL FEELINGS OF UNSTEADINESS: 1
SLEEP DISTURBANCE: 1
DEPRESSION: 0
HEADACHES: 1
BACK PAIN: 1
ACTIVITY CHANGE: 1
ARTHRALGIAS: 1
RHINORRHEA: 0
ABDOMINAL DISTENTION: 0
FATIGUE: 1
SORE THROAT: 0
NERVOUS/ANXIOUS: 1
LOSS OF SENSATION IN FEET: 0

## 2024-06-17 ASSESSMENT — COLUMBIA-SUICIDE SEVERITY RATING SCALE - C-SSRS
2. HAVE YOU ACTUALLY HAD ANY THOUGHTS OF KILLING YOURSELF?: NO
1. IN THE PAST MONTH, HAVE YOU WISHED YOU WERE DEAD OR WISHED YOU COULD GO TO SLEEP AND NOT WAKE UP?: NO
6. HAVE YOU EVER DONE ANYTHING, STARTED TO DO ANYTHING, OR PREPARED TO DO ANYTHING TO END YOUR LIFE?: NO

## 2024-06-17 ASSESSMENT — PATIENT HEALTH QUESTIONNAIRE - PHQ9
1. LITTLE INTEREST OR PLEASURE IN DOING THINGS: NOT AT ALL
SUM OF ALL RESPONSES TO PHQ9 QUESTIONS 1 AND 2: 0
2. FEELING DOWN, DEPRESSED OR HOPELESS: NOT AT ALL

## 2024-06-17 ASSESSMENT — PAIN SCALES - GENERAL: PAINLEVEL: 0-NO PAIN

## 2024-06-17 NOTE — PROGRESS NOTES
Subjective    Eliceo Murphy is a 79 y.o. male who presents for follow up.  Has R hand pain.  Patient is here for Thyroid test.  Complaining of mild bilateral  legs edema.     HPI    This is a 79 years old gentleman with medical history of hypertension, hyperlipidemia, gastroesophageal reflux disease, diabetes type 2, postherpetic neuralgia, history of colon CA 2013, S/p colectomy, history of CVA with right sided hemiparesis, dysarthria, asthma, COPD.  Patient is presented for follow-up.  Has R hand pain, has had injections done.  Blood glucose is not well controlled.     Has unsteady gait, using cane.   Has minimal ambulation.  Using cane.  Weak.   Encouraged to exercise more.  No shortness of breath or chest pain.  Does not check his blood glucose.     Still has weakness of his right side.  Taking medications as directed.  Blood pressure is stable.    Review of Systems   Constitutional:  Positive for activity change and fatigue.   HENT:  Negative for rhinorrhea and sore throat.    Gastrointestinal:  Negative for abdominal distention.   Musculoskeletal:  Positive for arthralgias, back pain and gait problem.   Neurological:  Positive for headaches.   Psychiatric/Behavioral:  Positive for sleep disturbance. The patient is nervous/anxious.        Objective        Vitals:    06/17/24 1335   BP: 122/62   Pulse: 64   Resp: 16   Temp: 37 °C (98.6 °F)        Physical Exam  Constitutional:       Appearance: Normal appearance.   HENT:      Head: Normocephalic.      Mouth/Throat:      Mouth: Mucous membranes are moist.   Cardiovascular:      Rate and Rhythm: Normal rate and regular rhythm.      Heart sounds: Normal heart sounds.   Pulmonary:      Breath sounds: Normal breath sounds.   Musculoskeletal:      Right lower leg: Edema present.      Left lower leg: Edema present.   Skin:     General: Skin is warm.   Neurological:      Mental Status: He is alert. Mental status is at baseline.       Diagnoses and all orders for this  visit:  Hypothyroidism, unspecified type (Primary)  -     Thyroid Stimulating Hormone  Primary hypertension  -     valsartan (Diovan) 80 mg tablet; Take 1 tablet (80 mg) by mouth once daily.  Diabetes type 2, no ocular involvement (Multi)  -     SITagliptin phosphate (Januvia) 100 mg tablet; Take 1 tablet (100 mg) by mouth once daily.  -     metFORMIN (Glucophage) 1,000 mg tablet; Take 1 tablet (1,000 mg) by mouth 2 times a day.  -     glimepiride (Amaryl) 4 mg tablet; Take 1 tablet (4 mg) by mouth 2 times a day.  Hypertension, unspecified type  -     metoprolol tartrate (Lopressor) 25 mg tablet; Take 1 tablet (25 mg) by mouth 2 times a day.  Hyperlipidemia, unspecified hyperlipidemia type  -     atorvastatin (Lipitor) 40 mg tablet; Take 1 tablet (40 mg) by mouth once daily.  Bilateral leg edema  -     Compression Stockings 20-30 mmHg     Diabetes type 2.  Keep 1800 ADA low-sodium low-cholesterol diet.  Taking glimepiride, metformin.   Exercise, diet, weight loss.     - HTN.  Well controlled.  Continue to take Current medications.  Avoid salt, exercise.     -Hyperlipidemia.  Continue to take atorvastatin.  Keep Mediterranean diet, exercise.  Return for fasting blood work.  Your LDL has to be less than 70.     - Asthma.  Followed by pulmonary.  Continuous cardiorespiratory rehabilitation.  Continue to use albuterol, Symbicort.  Using prednisone with exacerbation.     -BPH.  Continue to take finasteride, tamsulosin.  Follow-up with urology.     -History of metastatic colon CA 2013.  Patient had surgery done at [facility].  Will obtain all records.  In need to repeat colonoscopy.  CEA is today.     -Health maintenance.  Medicare wellness annual exam is  up to date.  All vaccination is up-to-date.  Colonoscopy record is pending.     - S/p CVA with right-sided weakness, dysphagia.  Patient had slow recovery.  Encourage ambulation, exercise.     - Class 1 Obesity with 31.74.    Diet, exercise.  Ideal BMI is less than  25.   Holly Mcarthur MD

## 2024-07-15 ENCOUNTER — APPOINTMENT (OUTPATIENT)
Dept: PRIMARY CARE | Facility: CLINIC | Age: 79
End: 2024-07-15
Payer: MEDICARE

## 2024-07-15 VITALS
RESPIRATION RATE: 16 BRPM | WEIGHT: 170 LBS | HEART RATE: 62 BPM | BODY MASS INDEX: 32.12 KG/M2 | TEMPERATURE: 98.1 F | SYSTOLIC BLOOD PRESSURE: 122 MMHG | DIASTOLIC BLOOD PRESSURE: 78 MMHG

## 2024-07-15 DIAGNOSIS — R06.02 SOB (SHORTNESS OF BREATH): Primary | ICD-10-CM

## 2024-07-15 DIAGNOSIS — J45.909 ASTHMA, UNSPECIFIED ASTHMA SEVERITY, UNSPECIFIED WHETHER COMPLICATED, UNSPECIFIED WHETHER PERSISTENT (HHS-HCC): ICD-10-CM

## 2024-07-15 PROCEDURE — 1126F AMNT PAIN NOTED NONE PRSNT: CPT | Performed by: INTERNAL MEDICINE

## 2024-07-15 PROCEDURE — 1036F TOBACCO NON-USER: CPT | Performed by: INTERNAL MEDICINE

## 2024-07-15 PROCEDURE — 1157F ADVNC CARE PLAN IN RCRD: CPT | Performed by: INTERNAL MEDICINE

## 2024-07-15 PROCEDURE — 1159F MED LIST DOCD IN RCRD: CPT | Performed by: INTERNAL MEDICINE

## 2024-07-15 PROCEDURE — 1160F RVW MEDS BY RX/DR IN RCRD: CPT | Performed by: INTERNAL MEDICINE

## 2024-07-15 PROCEDURE — 99212 OFFICE O/P EST SF 10 MIN: CPT | Performed by: INTERNAL MEDICINE

## 2024-07-15 ASSESSMENT — ENCOUNTER SYMPTOMS
NERVOUS/ANXIOUS: 1
APPETITE CHANGE: 0
FATIGUE: 1
COUGH: 1
BACK PAIN: 1
SHORTNESS OF BREATH: 1
ARTHRALGIAS: 1
ACTIVITY CHANGE: 0

## 2024-07-15 ASSESSMENT — PAIN SCALES - GENERAL: PAINLEVEL: 0-NO PAIN

## 2024-07-15 NOTE — PROGRESS NOTES
Subjective   Patient ID: Eliceo Murphy is a 79 y.o. male who presents for follow up.  Has SOB, in need for disability placards.    HPI     This is a 79 years old gentleman with medical history of hypertension, hyperlipidemia, gastroesophageal reflux disease, diabetes type 2, postherpetic neuralgia, history of colon CA 2013, S/p colectomy, history of CVA with right sided hemiparesis, dysarthria, asthma, COPD.  Patient is presented for follow-up.  Patient is in need for disability placard.  Has been having shortness of breath, using inhalers.     Has unsteady gait, using cane.   Has minimal ambulation.  Using cane.  Weak.   Encouraged to exercise more.  No shortness of breath or chest pain.  Does not check his blood glucose.     Still has weakness of his right side.  Taking medications as directed.  Blood pressure is stable.     Review of Systems   Constitutional:  Positive for fatigue. Negative for activity change and appetite change.   Respiratory:  Positive for cough and shortness of breath.    Cardiovascular:  Positive for leg swelling.   Musculoskeletal:  Positive for arthralgias, back pain and gait problem.   Psychiatric/Behavioral:  The patient is nervous/anxious.        Objective   /78   Pulse 62   Temp 36.7 °C (98.1 °F) (Temporal)   Resp 16   Wt 77.1 kg (170 lb)   BMI 32.12 kg/m²     Physical Exam  Constitutional:       Appearance: Normal appearance. He is obese.   HENT:      Head: Atraumatic.   Eyes:      Extraocular Movements: Extraocular movements intact.      Pupils: Pupils are equal, round, and reactive to light.   Cardiovascular:      Rate and Rhythm: Regular rhythm.      Heart sounds: Normal heart sounds.   Pulmonary:      Breath sounds: Normal breath sounds.   Abdominal:      Palpations: Abdomen is soft.   Musculoskeletal:      Right lower leg: Edema present.      Left lower leg: Edema present.   Neurological:      Mental Status: He is alert.         Assessment/Plan   Problem List Items  Addressed This Visit    None  Visit Diagnoses         Codes    SOB (shortness of breath)    -  Primary R06.02    Relevant Orders    Disability Placard    Asthma, unspecified asthma severity, unspecified whether complicated, unspecified whether persistent ([facility])     J45.909             Diabetes type 2.  Keep 1800 ADA low-sodium low-cholesterol diet.  Taking glimepiride, metformin.   Exercise, diet, weight loss.     - HTN.  Well controlled.  Continue to take Current medications.  Avoid salt, exercise.     -Hyperlipidemia.  Continue to take atorvastatin.  Keep Mediterranean diet, exercise.  Return for fasting blood work.  Your LDL has to be less than 70.     - Asthma.  Followed by pulmonary.  Continuous cardiorespiratory rehabilitation.  Continue to use albuterol, Symbicort.  Using prednisone with exacerbation.     -BPH.  Continue to take finasteride, tamsulosin.  Follow-up with urology.     -History of metastatic colon CA 2013.  Patient had surgery done at [facility].  Will obtain all records.  In need to repeat colonoscopy.  CEA is today.     -Health maintenance.  Medicare wellness annual exam is  up to date.  All vaccination is up-to-date.  Colonoscopy record is pending.     - S/p CVA with right-sided weakness, dysphagia.  Patient had slow recovery.  Encourage ambulation, exercise.     - Class 1 Obesity with 32.12.     Diet, exercise.  Ideal BMI is less than 25.   Holly Mcarthur MD

## 2024-08-01 PROCEDURE — G0179 MD RECERTIFICATION HHA PT: HCPCS | Performed by: INTERNAL MEDICINE

## 2024-09-30 PROCEDURE — G0179 MD RECERTIFICATION HHA PT: HCPCS | Performed by: INTERNAL MEDICINE

## 2024-10-25 ENCOUNTER — APPOINTMENT (OUTPATIENT)
Dept: PRIMARY CARE | Facility: CLINIC | Age: 79
End: 2024-10-25
Payer: MEDICARE

## 2024-10-25 VITALS
BODY MASS INDEX: 29.08 KG/M2 | HEIGHT: 62 IN | HEART RATE: 62 BPM | SYSTOLIC BLOOD PRESSURE: 118 MMHG | WEIGHT: 158 LBS | RESPIRATION RATE: 16 BRPM | TEMPERATURE: 97.5 F | DIASTOLIC BLOOD PRESSURE: 76 MMHG

## 2024-10-25 DIAGNOSIS — J30.0 VASOMOTOR RHINITIS: ICD-10-CM

## 2024-10-25 DIAGNOSIS — E11.51 TYPE 2 DIABETES MELLITUS WITH DIABETIC PERIPHERAL ANGIOPATHY WITHOUT GANGRENE, WITHOUT LONG-TERM CURRENT USE OF INSULIN (MULTI): ICD-10-CM

## 2024-10-25 DIAGNOSIS — R19.7 DIARRHEA, UNSPECIFIED TYPE: ICD-10-CM

## 2024-10-25 DIAGNOSIS — I10 PRIMARY HYPERTENSION: ICD-10-CM

## 2024-10-25 DIAGNOSIS — E11.9 DIABETES TYPE 2, NO OCULAR INVOLVEMENT: ICD-10-CM

## 2024-10-25 DIAGNOSIS — N40.0 BENIGN PROSTATIC HYPERPLASIA, UNSPECIFIED WHETHER LOWER URINARY TRACT SYMPTOMS PRESENT: ICD-10-CM

## 2024-10-25 DIAGNOSIS — E78.5 HYPERLIPIDEMIA, UNSPECIFIED HYPERLIPIDEMIA TYPE: ICD-10-CM

## 2024-10-25 DIAGNOSIS — C18.9 MALIGNANT NEOPLASM OF COLON, UNSPECIFIED PART OF COLON (MULTI): ICD-10-CM

## 2024-10-25 DIAGNOSIS — J45.909 ASTHMA, UNSPECIFIED ASTHMA SEVERITY, UNSPECIFIED WHETHER COMPLICATED, UNSPECIFIED WHETHER PERSISTENT (HHS-HCC): Primary | ICD-10-CM

## 2024-10-25 DIAGNOSIS — E03.9 HYPOTHYROIDISM, UNSPECIFIED TYPE: ICD-10-CM

## 2024-10-25 PROCEDURE — 1157F ADVNC CARE PLAN IN RCRD: CPT | Performed by: INTERNAL MEDICINE

## 2024-10-25 PROCEDURE — G0008 ADMIN INFLUENZA VIRUS VAC: HCPCS | Performed by: INTERNAL MEDICINE

## 2024-10-25 PROCEDURE — 3078F DIAST BP <80 MM HG: CPT | Performed by: INTERNAL MEDICINE

## 2024-10-25 PROCEDURE — 3074F SYST BP LT 130 MM HG: CPT | Performed by: INTERNAL MEDICINE

## 2024-10-25 PROCEDURE — G2211 COMPLEX E/M VISIT ADD ON: HCPCS | Performed by: INTERNAL MEDICINE

## 2024-10-25 PROCEDURE — 99213 OFFICE O/P EST LOW 20 MIN: CPT | Performed by: INTERNAL MEDICINE

## 2024-10-25 PROCEDURE — 1160F RVW MEDS BY RX/DR IN RCRD: CPT | Performed by: INTERNAL MEDICINE

## 2024-10-25 PROCEDURE — 90662 IIV NO PRSV INCREASED AG IM: CPT | Performed by: INTERNAL MEDICINE

## 2024-10-25 PROCEDURE — 1159F MED LIST DOCD IN RCRD: CPT | Performed by: INTERNAL MEDICINE

## 2024-10-25 RX ORDER — TAMSULOSIN HYDROCHLORIDE 0.4 MG/1
0.4 CAPSULE ORAL NIGHTLY
Qty: 90 CAPSULE | Refills: 2 | Status: SHIPPED | OUTPATIENT
Start: 2024-10-25 | End: 2025-01-23

## 2024-10-25 RX ORDER — ASPIRIN 81 MG/1
81 TABLET ORAL
Qty: 90 TABLET | Refills: 2 | Status: SHIPPED | OUTPATIENT
Start: 2024-10-25 | End: 2025-01-23

## 2024-10-25 RX ORDER — LOPERAMIDE HCL 2 MG
2 TABLET ORAL 4 TIMES DAILY PRN
Qty: 30 TABLET | Refills: 2 | Status: SHIPPED | OUTPATIENT
Start: 2024-10-25 | End: 2024-11-04

## 2024-10-25 RX ORDER — GLIMEPIRIDE 4 MG/1
4 TABLET ORAL 2 TIMES DAILY
Qty: 180 TABLET | Refills: 2 | Status: SHIPPED | OUTPATIENT
Start: 2024-10-25 | End: 2025-01-23

## 2024-10-25 RX ORDER — LEVOTHYROXINE SODIUM 25 UG/1
25 TABLET ORAL DAILY
Qty: 90 TABLET | Refills: 2 | Status: SHIPPED | OUTPATIENT
Start: 2024-10-25 | End: 2025-01-23

## 2024-10-25 RX ORDER — BUDESONIDE AND FORMOTEROL FUMARATE DIHYDRATE 160; 4.5 UG/1; UG/1
2 AEROSOL RESPIRATORY (INHALATION) 2 TIMES DAILY
Qty: 10.2 G | Refills: 2 | Status: SHIPPED | OUTPATIENT
Start: 2024-10-25

## 2024-10-25 RX ORDER — ATORVASTATIN CALCIUM 40 MG/1
40 TABLET, FILM COATED ORAL DAILY
Qty: 90 TABLET | Refills: 2 | Status: SHIPPED | OUTPATIENT
Start: 2024-10-25 | End: 2025-01-23

## 2024-10-25 RX ORDER — VALSARTAN 80 MG/1
80 TABLET ORAL DAILY
Qty: 90 TABLET | Refills: 2 | Status: SHIPPED | OUTPATIENT
Start: 2024-10-25 | End: 2025-01-23

## 2024-10-25 RX ORDER — FLUTICASONE PROPIONATE 50 MCG
1 SPRAY, SUSPENSION (ML) NASAL DAILY
Qty: 16 G | Refills: 5 | Status: SHIPPED | OUTPATIENT
Start: 2024-10-25 | End: 2025-10-25

## 2024-10-25 RX ORDER — METFORMIN HYDROCHLORIDE 1000 MG/1
1000 TABLET ORAL 2 TIMES DAILY
Qty: 180 TABLET | Refills: 3 | Status: SHIPPED | OUTPATIENT
Start: 2024-10-25 | End: 2025-01-23

## 2024-10-25 ASSESSMENT — ENCOUNTER SYMPTOMS
DIARRHEA: 1
ABDOMINAL PAIN: 0
ARTHRALGIAS: 1
APPETITE CHANGE: 0
COUGH: 0
UNEXPECTED WEIGHT CHANGE: 0
BACK PAIN: 1
ABDOMINAL DISTENTION: 0
ACTIVITY CHANGE: 0

## 2024-10-25 NOTE — PROGRESS NOTES
"Subjective   Patient ID: Eliceo Murphy is a 79 y.o. male who presents for follow-up.  Has occasional episodes of diarrhea.  Patient is interested to have influenza vaccination.  In need for blood work.  Patient is in need to have refilled all his medications.    HPI     This is a 79 years old gentleman with medical history of hypertension, hyperlipidemia, gastroesophageal reflux disease, diabetes type 2, postherpetic neuralgia, history of colon CA 2013, S/p colectomy, history of CVA with right sided hemiparesis, dysarthria, asthma, COPD.  Patient is presented for follow-up.  He is in the room with his daughter present.  Has been having difficulty to ambulate, trying to exercise, lost some weight.  Stated, that started to have more selective diet.  Patient is in need for medications refills.      Has minimal ambulation.  Using cane.     No shortness of breath or chest pain.  Does not check his blood glucose.     Still has weakness of his right side.  Taking medications as directed.  Blood pressure is stable.    Review of Systems   Constitutional:  Negative for activity change, appetite change and unexpected weight change.   Respiratory:  Negative for cough.    Gastrointestinal:  Positive for diarrhea. Negative for abdominal distention and abdominal pain.   Musculoskeletal:  Positive for arthralgias, back pain and gait problem.       Objective   /76   Pulse 62   Temp 36.4 °C (97.5 °F) (Temporal)   Resp 16   Ht 1.562 m (5' 1.5\")   Wt 71.7 kg (158 lb)   BMI 29.37 kg/m²     Physical Exam  HENT:      Head: Normocephalic.   Cardiovascular:      Rate and Rhythm: Regular rhythm.      Heart sounds: Normal heart sounds.   Pulmonary:      Breath sounds: Normal breath sounds.   Musculoskeletal:         General: Normal range of motion.      Cervical back: Normal range of motion.   Skin:     General: Skin is warm.   Neurological:      Mental Status: He is alert. Mental status is at baseline.   Psychiatric:         Mood " and Affect: Mood normal.         Assessment/Plan   Problem List Items Addressed This Visit             ICD-10-CM    Type 2 diabetes mellitus with diabetic peripheral angiopathy without gangrene, without long-term current use of insulin (Multi) E11.51    Malignant neoplasm of colon, unspecified part of colon (Multi) C18.9     Other Visit Diagnoses         Codes    Asthma, unspecified asthma severity, unspecified whether complicated, unspecified whether persistent ([facility])    -  Primary J45.909    Relevant Medications    budesonide-formoteroL (Symbicort) 160-4.5 mcg/actuation inhaler    Primary hypertension     I10    Relevant Medications    valsartan (Diovan) 80 mg tablet    aspirin 81 mg EC tablet    Diabetes type 2, no ocular involvement     E11.9    Relevant Medications    glimepiride (Amaryl) 4 mg tablet    metFORMIN (Glucophage) 1,000 mg tablet    SITagliptin phosphate (Januvia) 100 mg tablet    Hypothyroidism, unspecified type     E03.9    Relevant Medications    levothyroxine (Synthroid, Levoxyl) 25 mcg tablet    Vasomotor rhinitis     J30.0    Relevant Medications    fluticasone (Flonase) 50 mcg/actuation nasal spray    Benign prostatic hyperplasia, unspecified whether lower urinary tract symptoms present     N40.0    Relevant Medications    tamsulosin (Flomax) 0.4 mg 24 hr capsule    Hyperlipidemia, unspecified hyperlipidemia type     E78.5    Relevant Medications    atorvastatin (Lipitor) 40 mg tablet    Diarrhea, unspecified type     R19.7    Relevant Medications    loperamide (Imodium A-D) 2 mg tablet          Diarrhea episodes.  Please have your food diary done.  Episodes of once a week.  May relate to certain food.  Plenty of fluids.  Take Imodium as needed.     Diabetes type 2.  Keep 1800 ADA low-sodium low-cholesterol diet.  Taking glimepiride, metformin.   Exercise, diet, weight loss.     - HTN.  Well controlled.  Continue to take Current medications.  Avoid salt, exercise.      -Hyperlipidemia.  Continue to take atorvastatin.  Keep Mediterranean diet, exercise.  Return for fasting blood work.  Your LDL has to be less than 70.     - Asthma.  Followed by pulmonary.  Continuous cardiorespiratory rehabilitation.  Continue to use albuterol, Symbicort.  Using prednisone with exacerbation.     -BPH.  Continue to take finasteride, tamsulosin.  Follow-up with urology.     -History of metastatic colon CA 2013.  Patient had surgery done at [facility].  Will obtain all records.  In need to repeat colonoscopy.  CEA is today.     -Health maintenance.  Medicare wellness annual exam is  up to date.  All vaccination is up-to-date.  Colonoscopy record is pending.     - S/p CVA with right-sided weakness, dysphagia.  Patient had slow recovery.  Encourage ambulation, exercise.     - Class 1 Obesity with 29.37   Diet, exercise.  Ideal BMI is less than 25.  Lost 12 lbs.

## 2024-11-04 DIAGNOSIS — J45.909 ASTHMA, UNSPECIFIED ASTHMA SEVERITY, UNSPECIFIED WHETHER COMPLICATED, UNSPECIFIED WHETHER PERSISTENT (HHS-HCC): ICD-10-CM

## 2024-11-04 DIAGNOSIS — R53.83 MALAISE AND FATIGUE: ICD-10-CM

## 2024-11-04 DIAGNOSIS — R53.81 MALAISE AND FATIGUE: ICD-10-CM

## 2024-11-04 DIAGNOSIS — E55.9 VITAMIN D DEFICIENCY: ICD-10-CM

## 2024-11-04 DIAGNOSIS — E11.9 DIABETES TYPE 2, NO OCULAR INVOLVEMENT: ICD-10-CM

## 2024-11-04 DIAGNOSIS — E11.69 TYPE 2 DIABETES MELLITUS WITH OTHER SPECIFIED COMPLICATION, WITHOUT LONG-TERM CURRENT USE OF INSULIN: ICD-10-CM

## 2024-11-04 DIAGNOSIS — N40.1 BENIGN PROSTATIC HYPERPLASIA WITH LOWER URINARY TRACT SYMPTOMS, SYMPTOM DETAILS UNSPECIFIED: Primary | ICD-10-CM

## 2024-11-04 DIAGNOSIS — E53.8 VITAMIN B12 DEFICIENCY: ICD-10-CM

## 2024-11-04 DIAGNOSIS — E78.5 HYPERLIPIDEMIA, UNSPECIFIED HYPERLIPIDEMIA TYPE: ICD-10-CM

## 2024-11-04 DIAGNOSIS — E03.9 HYPOTHYROIDISM, UNSPECIFIED TYPE: ICD-10-CM

## 2024-11-04 RX ORDER — ALBUTEROL SULFATE 90 UG/1
2 INHALANT RESPIRATORY (INHALATION)
Qty: 18 G | Refills: 3 | Status: SHIPPED | OUTPATIENT
Start: 2024-11-04 | End: 2025-02-02

## 2024-11-04 RX ORDER — FINASTERIDE 5 MG/1
5 TABLET, FILM COATED ORAL DAILY
Qty: 90 TABLET | Refills: 3 | Status: SHIPPED | OUTPATIENT
Start: 2024-11-04 | End: 2025-02-02

## 2024-11-04 RX ORDER — BUDESONIDE AND FORMOTEROL FUMARATE DIHYDRATE 160; 4.5 UG/1; UG/1
2 AEROSOL RESPIRATORY (INHALATION) 2 TIMES DAILY
Qty: 10.2 G | Refills: 2 | Status: SHIPPED | OUTPATIENT
Start: 2024-11-04

## 2024-11-19 DIAGNOSIS — J30.0 VASOMOTOR RHINITIS: ICD-10-CM

## 2024-11-19 DIAGNOSIS — I10 HYPERTENSION, UNSPECIFIED TYPE: ICD-10-CM

## 2024-11-19 RX ORDER — FLUTICASONE PROPIONATE 50 MCG
1 SPRAY, SUSPENSION (ML) NASAL DAILY
Qty: 48 ML | Refills: 3 | Status: SHIPPED | OUTPATIENT
Start: 2024-11-19 | End: 2025-11-19

## 2024-11-22 NOTE — TELEPHONE ENCOUNTER
Lizett, daughter, is asking to call her again with results of her parents, she missed your phone call , blood work was done at [facility]

## 2024-11-25 DIAGNOSIS — E03.9 HYPOTHYROIDISM, UNSPECIFIED TYPE: Primary | ICD-10-CM

## 2024-11-25 RX ORDER — METOPROLOL TARTRATE 25 MG/1
25 TABLET, FILM COATED ORAL 2 TIMES DAILY
Qty: 180 TABLET | Refills: 3 | Status: SHIPPED | OUTPATIENT
Start: 2024-11-25 | End: 2025-02-23

## 2024-11-25 RX ORDER — LEVOTHYROXINE SODIUM 50 UG/1
50 TABLET ORAL DAILY
Qty: 90 TABLET | Refills: 3 | Status: SHIPPED | OUTPATIENT
Start: 2024-11-25 | End: 2025-02-23

## 2024-11-25 NOTE — TELEPHONE ENCOUNTER
D/w patient daughter about blood work results.       Hb A1 c 6.9, TSH 5.73,  Cholesterol 126, LDL 67, HDL 38.  Increase Synthroid to 50 mcg.  Repeat TSH in 6 to 8 weeks.

## 2024-11-29 PROCEDURE — G0179 MD RECERTIFICATION HHA PT: HCPCS | Performed by: INTERNAL MEDICINE

## 2025-01-21 ENCOUNTER — TELEPHONE (OUTPATIENT)
Dept: PRIMARY CARE | Facility: CLINIC | Age: 80
End: 2025-01-21
Payer: MEDICARE

## 2025-01-21 DIAGNOSIS — E03.9 HYPOTHYROIDISM, UNSPECIFIED TYPE: ICD-10-CM

## 2025-01-21 RX ORDER — LEVOTHYROXINE SODIUM 50 UG/1
50 TABLET ORAL DAILY
Qty: 90 TABLET | Refills: 3 | Status: SHIPPED | OUTPATIENT
Start: 2025-01-21 | End: 2025-04-21

## 2025-01-24 ENCOUNTER — HOSPITAL ENCOUNTER (OUTPATIENT)
Dept: RADIOLOGY | Facility: CLINIC | Age: 80
Discharge: HOME | End: 2025-01-24
Payer: MEDICARE

## 2025-01-24 ENCOUNTER — APPOINTMENT (OUTPATIENT)
Dept: PRIMARY CARE | Facility: CLINIC | Age: 80
End: 2025-01-24
Payer: MEDICARE

## 2025-01-24 DIAGNOSIS — R53.81 MALAISE AND FATIGUE: ICD-10-CM

## 2025-01-24 DIAGNOSIS — E11.51 TYPE 2 DIABETES MELLITUS WITH DIABETIC PERIPHERAL ANGIOPATHY WITHOUT GANGRENE, WITHOUT LONG-TERM CURRENT USE OF INSULIN (MULTI): ICD-10-CM

## 2025-01-24 DIAGNOSIS — E53.8 VITAMIN B12 DEFICIENCY: ICD-10-CM

## 2025-01-24 DIAGNOSIS — R06.02 SOB (SHORTNESS OF BREATH): Primary | ICD-10-CM

## 2025-01-24 DIAGNOSIS — C18.9 MALIGNANT NEOPLASM OF COLON, UNSPECIFIED PART OF COLON (MULTI): ICD-10-CM

## 2025-01-24 DIAGNOSIS — E55.9 VITAMIN D DEFICIENCY: ICD-10-CM

## 2025-01-24 DIAGNOSIS — R53.83 MALAISE AND FATIGUE: ICD-10-CM

## 2025-01-24 DIAGNOSIS — R06.02 SOB (SHORTNESS OF BREATH): ICD-10-CM

## 2025-01-24 DIAGNOSIS — E03.9 HYPOTHYROIDISM, UNSPECIFIED TYPE: ICD-10-CM

## 2025-01-24 DIAGNOSIS — E78.5 HYPERLIPIDEMIA, UNSPECIFIED HYPERLIPIDEMIA TYPE: ICD-10-CM

## 2025-01-24 DIAGNOSIS — R60.0 BILATERAL LEG EDEMA: ICD-10-CM

## 2025-01-24 DIAGNOSIS — I83.893 VARICOSE VEINS OF BOTH LEGS WITH EDEMA: ICD-10-CM

## 2025-01-24 PROCEDURE — 1036F TOBACCO NON-USER: CPT | Performed by: INTERNAL MEDICINE

## 2025-01-24 PROCEDURE — G2211 COMPLEX E/M VISIT ADD ON: HCPCS | Performed by: INTERNAL MEDICINE

## 2025-01-24 PROCEDURE — 99213 OFFICE O/P EST LOW 20 MIN: CPT | Performed by: INTERNAL MEDICINE

## 2025-01-24 PROCEDURE — 1157F ADVNC CARE PLAN IN RCRD: CPT | Performed by: INTERNAL MEDICINE

## 2025-01-24 PROCEDURE — 1159F MED LIST DOCD IN RCRD: CPT | Performed by: INTERNAL MEDICINE

## 2025-01-24 PROCEDURE — 71046 X-RAY EXAM CHEST 2 VIEWS: CPT

## 2025-01-24 RX ORDER — FUROSEMIDE 20 MG/1
20 TABLET ORAL DAILY
Qty: 30 TABLET | Refills: 2 | Status: SHIPPED | OUTPATIENT
Start: 2025-01-24 | End: 2025-02-23

## 2025-01-24 NOTE — PROGRESS NOTES
"Subjective   Patient ID: Eliceo Murphy is a 79 y.o. male who presents  with chief complaint of SOB, has bilateral leg edema.  Weak.  In need for BW.    HPI     This is a 79 years old gentleman with medical history of hypertension, hyperlipidemia, gastroesophageal reflux disease, diabetes type 2, postherpetic neuralgia, history of colon CA 2013, S/p colectomy, history of CVA with right sided hemiparesis, dysarthria, asthma, COPD.  Patient is presented for follow-up.  He is in the room with his daughter present.  Has bilateral lege edema.  Weak, having SOB.    Has been having difficulty to ambulate, trying to exercise, lost some weight.  Stated, that started to have more selective diet.  Patient is in need for medications refills.      Has minimal ambulation.  Using cane.     No shortness of breath or chest pain.  Does not check his blood glucose.     Still has weakness of his right side.  Taking medications as directed.  Blood pressure is stable.'    Review of Systems   Constitutional:  Positive for activity change and fatigue.   Respiratory:  Positive for shortness of breath.    Cardiovascular:  Positive for leg swelling.       Objective   /62   Pulse 64   Temp 37 °C (98.6 °F) (Temporal)   Resp 16   Ht 1.549 m (5' 1\")   Wt 76.2 kg (168 lb)   BMI 31.74 kg/m²     Physical Exam  HENT:      Head: Normocephalic.   Cardiovascular:      Rate and Rhythm: Normal rate and regular rhythm.      Heart sounds: Normal heart sounds.   Pulmonary:      Breath sounds: Normal breath sounds.   Abdominal:      Palpations: Abdomen is soft.   Musculoskeletal:         General: Normal range of motion.      Right lower leg: Edema present.      Left lower leg: Edema present.   Skin:     General: Skin is warm.   Neurological:      Mental Status: He is alert. Mental status is at baseline.         Assessment/Plan   Problem List Items Addressed This Visit             ICD-10-CM    Type 2 diabetes mellitus with diabetic peripheral " angiopathy without gangrene, without long-term current use of insulin (Multi) E11.51    Relevant Orders    Hemoglobin A1C    Albumin-Creatinine Ratio, Urine Random    Malignant neoplasm of colon, unspecified part of colon (Multi) C18.9    Relevant Orders    CEA     Other Visit Diagnoses         Codes    SOB (shortness of breath)    -  Primary R06.02    Relevant Orders    XR chest 2 views (Completed)    Referral to Cardiology    Transthoracic Echo (TTE) Complete    B-type natriuretic peptide    Hypothyroidism, unspecified type     E03.9    Relevant Orders    Thyroid Stimulating Hormone    Vitamin D deficiency     E55.9    Relevant Orders    Vitamin D 25-Hydroxy,Total (for eval of Vitamin D levels)    Vitamin B12 deficiency     E53.8    Relevant Orders    Vitamin B12    Malaise and fatigue     R53.81, R53.83    Relevant Orders    CBC    Hyperlipidemia, unspecified hyperlipidemia type     E78.5    Relevant Orders    Comprehensive Metabolic Panel    Lipid Panel    Varicose veins of both legs with edema     I83.893    Relevant Medications    furosemide (Lasix) 20 mg tablet    Bilateral leg edema     R60.0    Relevant Orders    Vascular US Lower Extremity Venous Duplex Left    Vascular US Lower Extremity Venous Duplex Right          Diarrhea episodes.   Resolved now.      Diabetes type 2.  Keep 1800 ADA low-sodium low-cholesterol diet.  Taking glimepiride, metformin.   Exercise, diet, weight loss.     - HTN.  Well controlled.  Continue to take Current medications.  Avoid salt, exercise.     -Hyperlipidemia.  Continue to take atorvastatin.  Keep Mediterranean diet, exercise.  Return for fasting blood work.  Your LDL has to be less than 70.     - Asthma.  Followed by pulmonary.  Continuous cardiorespiratory rehabilitation.  Continue to use albuterol, Symbicort.  Using prednisone with exacerbation.     -BPH.  Continue to take finasteride, tamsulosin.  Follow-up with urology.     -History of metastatic colon CA 2013.  Patient  had surgery done at [facility].  Will obtain all records.  In need to repeat colonoscopy.  CEA is today.     -Health maintenance.  Medicare wellness annual exam is  up to date.  All vaccination is up-to-date.  Colonoscopy record is pending.     - S/p CVA with right-sided weakness, dysphagia.  Patient had slow recovery.  Encourage ambulation, exercise.     - Class 1 Obesity with  31.74   Diet, exercise.  Ideal BMI is less than 25.   Added 10 lbs/

## 2025-01-25 VITALS
RESPIRATION RATE: 16 BRPM | BODY MASS INDEX: 31.72 KG/M2 | HEIGHT: 61 IN | HEART RATE: 64 BPM | TEMPERATURE: 98.6 F | DIASTOLIC BLOOD PRESSURE: 62 MMHG | WEIGHT: 168 LBS | SYSTOLIC BLOOD PRESSURE: 122 MMHG

## 2025-01-25 ASSESSMENT — ENCOUNTER SYMPTOMS
SHORTNESS OF BREATH: 1
FATIGUE: 1
ACTIVITY CHANGE: 1

## 2025-01-28 PROCEDURE — G0179 MD RECERTIFICATION HHA PT: HCPCS | Performed by: INTERNAL MEDICINE

## 2025-01-31 ENCOUNTER — APPOINTMENT (OUTPATIENT)
Dept: VASCULAR MEDICINE | Facility: CLINIC | Age: 80
End: 2025-01-31
Payer: MEDICARE

## 2025-01-31 ENCOUNTER — HOSPITAL ENCOUNTER (OUTPATIENT)
Dept: VASCULAR MEDICINE | Facility: CLINIC | Age: 80
Discharge: HOME | End: 2025-01-31
Payer: MEDICARE

## 2025-01-31 DIAGNOSIS — R60.0 BILATERAL LEG EDEMA: ICD-10-CM

## 2025-01-31 PROCEDURE — 93970 EXTREMITY STUDY: CPT | Performed by: INTERNAL MEDICINE

## 2025-01-31 PROCEDURE — 93970 EXTREMITY STUDY: CPT

## 2025-02-07 ENCOUNTER — APPOINTMENT (OUTPATIENT)
Dept: CARDIOLOGY | Facility: CLINIC | Age: 80
End: 2025-02-07
Payer: MEDICARE

## 2025-02-07 ENCOUNTER — HOSPITAL ENCOUNTER (OUTPATIENT)
Dept: CARDIOLOGY | Facility: CLINIC | Age: 80
Discharge: HOME | End: 2025-02-07
Payer: MEDICARE

## 2025-02-07 DIAGNOSIS — R06.02 SOB (SHORTNESS OF BREATH): ICD-10-CM

## 2025-02-07 DIAGNOSIS — R06.02 SOB (SHORTNESS OF BREATH): Primary | ICD-10-CM

## 2025-02-07 LAB
AORTIC VALVE PEAK VELOCITY: 1.24 M/S
AV PEAK GRADIENT: 6 MMHG
AVA (PEAK VEL): 2.53 CM2
EJECTION FRACTION APICAL 4 CHAMBER: 69
EJECTION FRACTION: 68 %
LEFT ATRIUM VOLUME AREA LENGTH INDEX BSA: 29.7 ML/M2
LEFT VENTRICLE INTERNAL DIMENSION DIASTOLE: 5.14 CM (ref 3.5–6)
LEFT VENTRICULAR OUTFLOW TRACT DIAMETER: 1.93 CM
MITRAL VALVE E/A RATIO: 1.02
RIGHT VENTRICLE FREE WALL PEAK S': 14 CM/S
TRICUSPID ANNULAR PLANE SYSTOLIC EXCURSION: 2.2 CM

## 2025-02-07 PROCEDURE — C8929 TTE W OR WO FOL WCON,DOPPLER: HCPCS

## 2025-02-07 PROCEDURE — 2500000004 HC RX 250 GENERAL PHARMACY W/ HCPCS (ALT 636 FOR OP/ED): Performed by: STUDENT IN AN ORGANIZED HEALTH CARE EDUCATION/TRAINING PROGRAM

## 2025-02-07 PROCEDURE — 93306 TTE W/DOPPLER COMPLETE: CPT | Performed by: INTERNAL MEDICINE

## 2025-02-07 RX ADMIN — PERFLUTREN 4 ML OF DILUTION: 6.52 INJECTION, SUSPENSION INTRAVENOUS at 09:34

## 2025-02-16 DIAGNOSIS — I83.893 VARICOSE VEINS OF BOTH LEGS WITH EDEMA: ICD-10-CM

## 2025-02-17 RX ORDER — FUROSEMIDE 20 MG/1
20 TABLET ORAL DAILY
Qty: 90 TABLET | Refills: 3 | Status: SHIPPED | OUTPATIENT
Start: 2025-02-17

## 2025-03-24 ENCOUNTER — TELEPHONE (OUTPATIENT)
Dept: PRIMARY CARE | Facility: CLINIC | Age: 80
End: 2025-03-24
Payer: MEDICARE

## 2025-03-24 NOTE — TELEPHONE ENCOUNTER
Patient's daughter called, that patient has been having diarrhea.  Stop metformin.  See GI for colonoscopy.  Advised to be seen.  Should his blood glucose will increase, will add Jardiance.

## 2025-03-29 PROCEDURE — G0179 MD RECERTIFICATION HHA PT: HCPCS | Performed by: INTERNAL MEDICINE

## 2025-04-21 ENCOUNTER — APPOINTMENT (OUTPATIENT)
Dept: PRIMARY CARE | Facility: CLINIC | Age: 80
End: 2025-04-21
Payer: MEDICARE

## 2025-04-21 VITALS
BODY MASS INDEX: 32.31 KG/M2 | SYSTOLIC BLOOD PRESSURE: 108 MMHG | TEMPERATURE: 97.3 F | RESPIRATION RATE: 16 BRPM | HEART RATE: 64 BPM | DIASTOLIC BLOOD PRESSURE: 76 MMHG | WEIGHT: 171 LBS

## 2025-04-21 DIAGNOSIS — E53.8 VITAMIN B12 DEFICIENCY: ICD-10-CM

## 2025-04-21 DIAGNOSIS — R05.9 COUGH, UNSPECIFIED TYPE: ICD-10-CM

## 2025-04-21 DIAGNOSIS — I10 PRIMARY HYPERTENSION: ICD-10-CM

## 2025-04-21 DIAGNOSIS — E03.9 HYPOTHYROIDISM, UNSPECIFIED TYPE: ICD-10-CM

## 2025-04-21 DIAGNOSIS — R53.83 MALAISE AND FATIGUE: ICD-10-CM

## 2025-04-21 DIAGNOSIS — J45.909 ASTHMA, UNSPECIFIED ASTHMA SEVERITY, UNSPECIFIED WHETHER COMPLICATED, UNSPECIFIED WHETHER PERSISTENT (HHS-HCC): Primary | ICD-10-CM

## 2025-04-21 DIAGNOSIS — E78.5 HYPERLIPIDEMIA, UNSPECIFIED HYPERLIPIDEMIA TYPE: ICD-10-CM

## 2025-04-21 DIAGNOSIS — J98.3: ICD-10-CM

## 2025-04-21 DIAGNOSIS — E55.9 VITAMIN D DEFICIENCY: ICD-10-CM

## 2025-04-21 DIAGNOSIS — I10 HYPERTENSION, UNSPECIFIED TYPE: ICD-10-CM

## 2025-04-21 DIAGNOSIS — E11.9 DIABETES TYPE 2, NO OCULAR INVOLVEMENT: ICD-10-CM

## 2025-04-21 DIAGNOSIS — Z00.00 ROUTINE GENERAL MEDICAL EXAMINATION AT HEALTH CARE FACILITY: ICD-10-CM

## 2025-04-21 DIAGNOSIS — R53.81 MALAISE AND FATIGUE: ICD-10-CM

## 2025-04-21 DIAGNOSIS — E11.51 TYPE 2 DIABETES MELLITUS WITH DIABETIC PERIPHERAL ANGIOPATHY WITHOUT GANGRENE, WITHOUT LONG-TERM CURRENT USE OF INSULIN (MULTI): ICD-10-CM

## 2025-04-21 DIAGNOSIS — J44.9 CHRONIC OBSTRUCTIVE PULMONARY DISEASE, UNSPECIFIED COPD TYPE (MULTI): ICD-10-CM

## 2025-04-21 PROCEDURE — G0439 PPPS, SUBSEQ VISIT: HCPCS | Performed by: INTERNAL MEDICINE

## 2025-04-21 PROCEDURE — 1123F ACP DISCUSS/DSCN MKR DOCD: CPT | Performed by: INTERNAL MEDICINE

## 2025-04-21 PROCEDURE — 1170F FXNL STATUS ASSESSED: CPT | Performed by: INTERNAL MEDICINE

## 2025-04-21 PROCEDURE — 1159F MED LIST DOCD IN RCRD: CPT | Performed by: INTERNAL MEDICINE

## 2025-04-21 PROCEDURE — 1160F RVW MEDS BY RX/DR IN RCRD: CPT | Performed by: INTERNAL MEDICINE

## 2025-04-21 PROCEDURE — 1036F TOBACCO NON-USER: CPT | Performed by: INTERNAL MEDICINE

## 2025-04-21 PROCEDURE — 3078F DIAST BP <80 MM HG: CPT | Performed by: INTERNAL MEDICINE

## 2025-04-21 PROCEDURE — 3074F SYST BP LT 130 MM HG: CPT | Performed by: INTERNAL MEDICINE

## 2025-04-21 PROCEDURE — 1157F ADVNC CARE PLAN IN RCRD: CPT | Performed by: INTERNAL MEDICINE

## 2025-04-21 PROCEDURE — 1158F ADVNC CARE PLAN TLK DOCD: CPT | Performed by: INTERNAL MEDICINE

## 2025-04-21 PROCEDURE — 99213 OFFICE O/P EST LOW 20 MIN: CPT | Performed by: INTERNAL MEDICINE

## 2025-04-21 PROCEDURE — 1126F AMNT PAIN NOTED NONE PRSNT: CPT | Performed by: INTERNAL MEDICINE

## 2025-04-21 RX ORDER — GLIMEPIRIDE 4 MG/1
4 TABLET ORAL 2 TIMES DAILY
Qty: 180 TABLET | Refills: 2 | Status: SHIPPED | OUTPATIENT
Start: 2025-04-21 | End: 2025-07-20

## 2025-04-21 RX ORDER — METOPROLOL TARTRATE 25 MG/1
25 TABLET, FILM COATED ORAL 2 TIMES DAILY
Qty: 180 TABLET | Refills: 3 | Status: SHIPPED | OUTPATIENT
Start: 2025-04-21 | End: 2025-07-20

## 2025-04-21 RX ORDER — TAMSULOSIN HYDROCHLORIDE 0.4 MG/1
CAPSULE ORAL
COMMUNITY
Start: 2025-04-13

## 2025-04-21 RX ORDER — TROSPIUM CHLORIDE 20 MG/1
20 TABLET, FILM COATED ORAL
COMMUNITY
Start: 2025-04-08

## 2025-04-21 RX ORDER — AMOXICILLIN AND CLAVULANATE POTASSIUM 875; 125 MG/1; MG/1
875 TABLET, FILM COATED ORAL 2 TIMES DAILY
Qty: 14 TABLET | Refills: 0 | Status: SHIPPED | OUTPATIENT
Start: 2025-04-21 | End: 2025-04-28

## 2025-04-21 RX ORDER — VALSARTAN 80 MG/1
80 TABLET ORAL DAILY
Qty: 90 TABLET | Refills: 2 | Status: SHIPPED | OUTPATIENT
Start: 2025-04-21 | End: 2025-07-20

## 2025-04-21 RX ORDER — METHYLPREDNISOLONE 4 MG/1
TABLET ORAL
Qty: 21 TABLET | Refills: 0 | Status: SHIPPED | OUTPATIENT
Start: 2025-04-21 | End: 2025-04-27

## 2025-04-21 RX ORDER — FINASTERIDE 5 MG/1
5 TABLET, FILM COATED ORAL DAILY
COMMUNITY
Start: 2025-02-19

## 2025-04-21 RX ORDER — BUDESONIDE, GLYCOPYRROLATE, AND FORMOTEROL FUMARATE 160; 9; 4.8 UG/1; UG/1; UG/1
2 AEROSOL, METERED RESPIRATORY (INHALATION)
Qty: 24 G | Refills: 3 | Status: SHIPPED | OUTPATIENT
Start: 2025-04-21 | End: 2025-05-21

## 2025-04-21 ASSESSMENT — ACTIVITIES OF DAILY LIVING (ADL)
GROCERY SHOPPING: TOTAL CARE
WALKS IN HOME: INDEPENDENT
USING TELEPHONE: TOTAL CARE
MANAGING FINANCES: TOTAL CARE
HEARING - LEFT EAR: FUNCTIONAL
USING TRANSPORTATION: TOTAL CARE
PATIENT'S MEMORY ADEQUATE TO SAFELY COMPLETE DAILY ACTIVITIES?: YES
EATING: INDEPENDENT
DRESSING YOURSELF: INDEPENDENT
TAKING MEDICATION: NEEDS ASSISTANCE
PREPARING MEALS: TOTAL CARE
JUDGMENT_ADEQUATE_SAFELY_COMPLETE_DAILY_ACTIVITIES: YES
PILL BOX USED: YES
BATHING: NEEDS ASSISTANCE
TOILETING: INDEPENDENT
DOING HOUSEWORK: TOTAL CARE
HEARING - RIGHT EAR: FUNCTIONAL
NEEDS ASSISTANCE WITH FOOD: SET UP OF PLATE
ADEQUATE_TO_COMPLETE_ADL: YES
GROOMING: INDEPENDENT
FEEDING YOURSELF: INDEPENDENT
STIL DRIVING: NO

## 2025-04-21 ASSESSMENT — ANXIETY QUESTIONNAIRES
1. FEELING NERVOUS, ANXIOUS, OR ON EDGE: SEVERAL DAYS
GAD7 TOTAL SCORE: 1
6. BECOMING EASILY ANNOYED OR IRRITABLE: NOT AT ALL
3. WORRYING TOO MUCH ABOUT DIFFERENT THINGS: NOT AT ALL
IF YOU CHECKED OFF ANY PROBLEMS ON THIS QUESTIONNAIRE, HOW DIFFICULT HAVE THESE PROBLEMS MADE IT FOR YOU TO DO YOUR WORK, TAKE CARE OF THINGS AT HOME, OR GET ALONG WITH OTHER PEOPLE: NOT DIFFICULT AT ALL
7. FEELING AFRAID AS IF SOMETHING AWFUL MIGHT HAPPEN: NOT AT ALL
5. BEING SO RESTLESS THAT IT IS HARD TO SIT STILL: NOT AT ALL
2. NOT BEING ABLE TO STOP OR CONTROL WORRYING: NOT AT ALL
4. TROUBLE RELAXING: NOT AT ALL

## 2025-04-21 ASSESSMENT — ENCOUNTER SYMPTOMS
COUGH: 1
FATIGUE: 1
DEPRESSION: 0
OCCASIONAL FEELINGS OF UNSTEADINESS: 1
ARTHRALGIAS: 1
ACTIVITY CHANGE: 1
LOSS OF SENSATION IN FEET: 0
BACK PAIN: 1
SHORTNESS OF BREATH: 0
SLEEP DISTURBANCE: 1
WHEEZING: 1

## 2025-04-21 ASSESSMENT — COLUMBIA-SUICIDE SEVERITY RATING SCALE - C-SSRS
6. HAVE YOU EVER DONE ANYTHING, STARTED TO DO ANYTHING, OR PREPARED TO DO ANYTHING TO END YOUR LIFE?: NO
2. HAVE YOU ACTUALLY HAD ANY THOUGHTS OF KILLING YOURSELF?: NO
1. IN THE PAST MONTH, HAVE YOU WISHED YOU WERE DEAD OR WISHED YOU COULD GO TO SLEEP AND NOT WAKE UP?: NO

## 2025-04-21 ASSESSMENT — PATIENT HEALTH QUESTIONNAIRE - PHQ9
1. LITTLE INTEREST OR PLEASURE IN DOING THINGS: NOT AT ALL
2. FEELING DOWN, DEPRESSED OR HOPELESS: NOT AT ALL
SUM OF ALL RESPONSES TO PHQ9 QUESTIONS 1 AND 2: 0

## 2025-04-21 ASSESSMENT — PAIN SCALES - GENERAL: PAINLEVEL_OUTOF10: 0-NO PAIN

## 2025-04-21 NOTE — PROGRESS NOTES
Subjective   Patient ID: Eliceo Murphy is a 80 y.o. male who presents for Medicare wellness annual exam.  Developed cough, wheezing.  Weak.  Using albuterol.  Wheezing, cough.      HPI     This is a 80 years old gentleman with medical history of hypertension, hyperlipidemia, gastroesophageal reflux disease, diabetes type 2, postherpetic neuralgia, history of colon CA 2013, S/p colectomy, history of CVA with right sided hemiparesis, dysarthria, asthma, COPD.  Patient is presented for Medicare wellness annual exam.  He is in the room with his daughter and wife present.  Has been having cough for 10 days.  No fever or chills.  Mild wheezing.  No chest pain.     Has been having difficulty to ambulate, trying to exercise, lost some weight.  Stated, that started to have more selective diet.  Patient is in need for medications refills.      Has minimal ambulation.  Using cane.     No shortness of breath or chest pain.  Does not check his blood glucose.     Still has weakness of his right side.  Taking medications as directed.  Blood pressure is stable.'     Review of Systems   Constitutional:  Positive for activity change and fatigue.   Respiratory:  Positive for cough and wheezing. Negative for shortness of breath.    Musculoskeletal:  Positive for arthralgias, back pain and gait problem.   Psychiatric/Behavioral:  Positive for sleep disturbance.        Objective   /76   Pulse 64   Temp 36.3 °C (97.3 °F) (Temporal)   Resp 16   Wt 77.6 kg (171 lb)   BMI 32.31 kg/m²     Physical Exam  HENT:      Head: Normocephalic.      Right Ear: Tympanic membrane normal.      Left Ear: Tympanic membrane normal.      Nose: Nose normal.   Eyes:      Pupils: Pupils are equal, round, and reactive to light.   Cardiovascular:      Rate and Rhythm: Normal rate and regular rhythm.   Pulmonary:      Breath sounds: Wheezing present.   Abdominal:      Palpations: Abdomen is soft.   Musculoskeletal:         General: Tenderness present.    Skin:     General: Skin is warm.   Neurological:      Mental Status: He is alert.         Assessment/Plan   Problem List Items Addressed This Visit           ICD-10-CM    Type 2 diabetes mellitus with diabetic peripheral angiopathy without gangrene, without long-term current use of insulin (Multi) E11.51    Relevant Orders    Hemoglobin A1C    Compensatory emphysema (Multi) J98.3    Chronic obstructive pulmonary disease, unspecified COPD type (Multi) J44.9     Other Visit Diagnoses         Codes      Asthma, unspecified asthma severity, unspecified whether complicated, unspecified whether persistent ([facility])    -  Primary J45.909    Relevant Medications    budesonide-glycopyr-formoterol (Breztri Aerosphere) 160-9-4.8 mcg/actuation HFA aerosol inhaler      Routine general medical examination at health care facility     Z00.00    Relevant Orders    1 Year Follow Up In Primary Care - Wellness Exam      Primary hypertension     I10    Relevant Medications    valsartan (Diovan) 80 mg tablet      Diabetes type 2, no ocular involvement     E11.9    Relevant Medications    SITagliptin phosphate (Januvia) 100 mg tablet    glimepiride (Amaryl) 4 mg tablet    Other Relevant Orders    Albumin-Creatinine Ratio, Urine Random      Hypertension, unspecified type     I10    Relevant Medications    metoprolol tartrate (Lopressor) 25 mg tablet      Hypothyroidism, unspecified type     E03.9      Vitamin D deficiency     E55.9    Relevant Orders    Vitamin D 25-Hydroxy,Total (for eval of Vitamin D levels)      Vitamin B12 deficiency     E53.8      Malaise and fatigue     R53.81, R53.83    Relevant Orders    Thyroid Stimulating Hormone    CBC      Hyperlipidemia, unspecified hyperlipidemia type     E78.5    Relevant Orders    Comprehensive Metabolic Panel    Lipid Panel      Cough, unspecified type     R05.9    Relevant Medications    amoxicillin-clavulanate (Augmentin) 875-125 mg tablet    methylPREDNISolone (Medrol Dospak) 4 mg  tablets          Acute bronchitis.  Asthma exacerbation.  Start on Augmentin 875 mg twice a day.  Use Breztri as directed.      Diarrhea episodes.   Resolved now, off metformin.      Diabetes type 2.  Keep 1800 ADA low-sodium low-cholesterol diet.  Taking glimepiride, metformin.   Exercise, diet, weight loss.     - HTN.  Well controlled.  Continue to take Current medications.  Avoid salt, exercise.     -Hyperlipidemia.  Continue to take atorvastatin.  Keep Mediterranean diet, exercise.  Return for fasting blood work.  Your LDL has to be less than 70.     -BPH.  Continue to take finasteride, tamsulosin.  Follow-up with urology.     -History of metastatic colon CA 2013.  Patient had surgery done at [facility].  Will obtain all records.  In need to repeat colonoscopy.  CEA is today.     -Health maintenance.  Medicare wellness annual exam is  today.  All vaccination is up-to-date.  Colonoscopy record is pending.     - S/p CVA with right-sided weakness, dysphagia.  Patient had slow recovery.  Encourage ambulation, exercise.     - Class 1 Obesity with  32.31   Diet, exercise.  Ideal BMI is less than 25.   Added 10 lbs/

## 2025-04-28 ENCOUNTER — APPOINTMENT (OUTPATIENT)
Dept: PRIMARY CARE | Facility: CLINIC | Age: 80
End: 2025-04-28
Payer: MEDICARE

## 2025-05-05 ENCOUNTER — APPOINTMENT (OUTPATIENT)
Dept: PRIMARY CARE | Facility: CLINIC | Age: 80
End: 2025-05-05
Payer: MEDICARE

## 2025-05-16 ENCOUNTER — APPOINTMENT (OUTPATIENT)
Dept: PRIMARY CARE | Facility: CLINIC | Age: 80
End: 2025-05-16
Payer: MEDICARE

## 2025-05-16 VITALS
HEART RATE: 64 BPM | WEIGHT: 168 LBS | RESPIRATION RATE: 16 BRPM | BODY MASS INDEX: 31.72 KG/M2 | SYSTOLIC BLOOD PRESSURE: 122 MMHG | DIASTOLIC BLOOD PRESSURE: 62 MMHG | TEMPERATURE: 98.1 F | HEIGHT: 61 IN

## 2025-05-16 DIAGNOSIS — E11.51 TYPE 2 DIABETES MELLITUS WITH DIABETIC PERIPHERAL ANGIOPATHY WITHOUT GANGRENE, WITHOUT LONG-TERM CURRENT USE OF INSULIN (MULTI): Primary | ICD-10-CM

## 2025-05-16 DIAGNOSIS — E03.9 HYPOTHYROIDISM, UNSPECIFIED TYPE: ICD-10-CM

## 2025-05-16 LAB — POC FINGERSTICK BLOOD GLUCOSE: 140 MG/DL (ref 70–100)

## 2025-05-16 PROCEDURE — 1159F MED LIST DOCD IN RCRD: CPT | Performed by: INTERNAL MEDICINE

## 2025-05-16 PROCEDURE — G2211 COMPLEX E/M VISIT ADD ON: HCPCS | Performed by: INTERNAL MEDICINE

## 2025-05-16 PROCEDURE — 82962 GLUCOSE BLOOD TEST: CPT | Performed by: INTERNAL MEDICINE

## 2025-05-16 PROCEDURE — 99213 OFFICE O/P EST LOW 20 MIN: CPT | Performed by: INTERNAL MEDICINE

## 2025-05-16 PROCEDURE — 1160F RVW MEDS BY RX/DR IN RCRD: CPT | Performed by: INTERNAL MEDICINE

## 2025-05-16 RX ORDER — DAPAGLIFLOZIN 5 MG/1
5 TABLET, FILM COATED ORAL DAILY
Qty: 100 TABLET | Refills: 3 | Status: SHIPPED | OUTPATIENT
Start: 2025-05-16 | End: 2026-06-20

## 2025-05-16 ASSESSMENT — ENCOUNTER SYMPTOMS
FATIGUE: 1
BACK PAIN: 1
ACTIVITY CHANGE: 1
ARTHRALGIAS: 1

## 2025-05-16 NOTE — PROGRESS NOTES
"Subjective   Patient ID: Eliceo Murphy is a 80 y.o. male who presents  follow up of BW results.  Patient is in the room with his daughter present.  Feeling better, after stopped taking metformin.  Blood glucose is stable, well-controlled.    HPI     This is a 80 years old gentleman with medical history of hypertension, hyperlipidemia, gastroesophageal reflux disease, diabetes type 2, postherpetic neuralgia, history of colon CA 2013, S/p colectomy, history of CVA with right sided hemiparesis, dysarthria, asthma, COPD.  Patient is presented for follow-up of his blood work results.    He is in the room with his daughter   present.  Keeping a strict diet, feeling much better, after metformin has been stopped.  No diarrhea episodes, abdominal pain.      Has minimal ambulation.  Using cane.     No shortness of breath or chest pain.  Does not check his blood glucose.     Still has weakness of his right side.  Taking medications as directed.  Blood pressure is stable.'     Review of Systems   Constitutional:  Positive for activity change and fatigue.   Musculoskeletal:  Positive for arthralgias, back pain and gait problem.       Objective   /62   Pulse 64   Temp 36.7 °C (98.1 °F) (Temporal)   Resp 16   Ht 1.549 m (5' 1\")   Wt 76.2 kg (168 lb)   BMI 31.74 kg/m²     Physical Exam  HENT:      Head: Normocephalic.      Right Ear: Tympanic membrane normal.      Left Ear: Tympanic membrane normal.   Eyes:      Pupils: Pupils are equal, round, and reactive to light.   Cardiovascular:      Rate and Rhythm: Normal rate and regular rhythm.      Heart sounds: Normal heart sounds.   Pulmonary:      Breath sounds: Normal breath sounds.   Abdominal:      Palpations: Abdomen is soft.   Musculoskeletal:         General: Tenderness present.   Skin:     General: Skin is warm.      Findings: Erythema present.   Neurological:      Mental Status: He is alert. Mental status is at baseline.   Psychiatric:         Mood and Affect: Mood " normal.         Assessment/Plan   Problem List Items Addressed This Visit           ICD-10-CM    Type 2 diabetes mellitus with diabetic peripheral angiopathy without gangrene, without long-term current use of insulin (Multi) - Primary E11.51    Relevant Medications    dapagliflozin propanediol (Farxiga) 5 mg tablet    Other Relevant Orders    POCT fingerstick glucose manually resulted (Completed)     Other Visit Diagnoses         Codes      Hypothyroidism, unspecified type     E03.9    Relevant Medications    levothyroxine (Synthroid) 50 mcg tablet             Diabetes type 2.  Not well-controlled.  Hemoglobin A1c 7.3  done 5/8 2025.  Keep 1800 ADA low-sodium low-cholesterol diet.  Taking glimepiride,  off metformin.  Add farxiga  5 mg once daily.   Exercise, diet, weight loss.     Hypothyroidism.  You are not taking Synthroid daily.  Please, take 30 minutes before breakfast levothyroxine 50 mcg.    - HTN.  Well controlled.  Continue to take Current medications.  Avoid salt, exercise.     -Hyperlipidemia.  Continue to take atorvastatin 40 mg daily.  Keep Mediterranean diet, exercise.  Total cholesterol 139, LDL is 73 5/8/2025.    S/p  Acute bronchitis.  Asthma exacerbation.  Resolved, improved.  Treated with respiratory therapy, Augmentin.      Diarrhea episodes.   Resolved now, off metformin.      -BPH.  Continue to take finasteride, tamsulosin.  Follow-up with urology.     -History of metastatic colon CA 2013.  Patient had surgery done at [facility].  Will obtain all records.  In need to repeat colonoscopy.  CEA is today.     -Health maintenance.  Medicare wellness annual exam is up to date.  All vaccination is up-to-date.  Colonoscopy record is pending.     - S/p CVA with right-sided weakness, dysphagia.  Patient had slow recovery.  Encourage ambulation, exercise.     - Class 1 Obesity with  31.74   Diet, exercise.  Ideal BMI is less than 25.   Added 10 lbs/

## 2025-05-19 RX ORDER — LEVOTHYROXINE SODIUM 50 UG/1
50 TABLET ORAL DAILY
Qty: 90 TABLET | Refills: 1 | Status: SHIPPED | OUTPATIENT
Start: 2025-05-19 | End: 2025-08-17

## 2025-05-28 PROCEDURE — G0179 MD RECERTIFICATION HHA PT: HCPCS | Performed by: INTERNAL MEDICINE

## 2025-06-30 ENCOUNTER — OFFICE VISIT (OUTPATIENT)
Dept: PRIMARY CARE | Facility: CLINIC | Age: 80
End: 2025-06-30
Payer: MEDICARE

## 2025-06-30 VITALS
BODY MASS INDEX: 31.72 KG/M2 | TEMPERATURE: 97.3 F | WEIGHT: 168 LBS | SYSTOLIC BLOOD PRESSURE: 122 MMHG | HEIGHT: 61 IN | DIASTOLIC BLOOD PRESSURE: 70 MMHG

## 2025-06-30 DIAGNOSIS — L03.115 CELLULITIS OF RIGHT LOWER EXTREMITY: Primary | ICD-10-CM

## 2025-06-30 PROCEDURE — 99214 OFFICE O/P EST MOD 30 MIN: CPT | Performed by: PHYSICIAN ASSISTANT

## 2025-06-30 PROCEDURE — 1036F TOBACCO NON-USER: CPT | Performed by: PHYSICIAN ASSISTANT

## 2025-06-30 PROCEDURE — 1126F AMNT PAIN NOTED NONE PRSNT: CPT | Performed by: PHYSICIAN ASSISTANT

## 2025-06-30 RX ORDER — PREDNISONE 20 MG/1
TABLET ORAL
COMMUNITY
Start: 2024-12-11

## 2025-06-30 RX ORDER — DOXYCYCLINE 100 MG/1
100 CAPSULE ORAL 2 TIMES DAILY
Qty: 20 CAPSULE | Refills: 0 | Status: SHIPPED | OUTPATIENT
Start: 2025-06-30 | End: 2025-07-10

## 2025-06-30 RX ORDER — BUDESONIDE, GLYCOPYRROLATE, AND FORMOTEROL FUMARATE 160; 9; 4.8 UG/1; UG/1; UG/1
2 AEROSOL, METERED RESPIRATORY (INHALATION) 2 TIMES DAILY
COMMUNITY
Start: 2025-04-21

## 2025-06-30 RX ORDER — LEVOTHYROXINE SODIUM 25 UG/1
1 TABLET ORAL
COMMUNITY
Start: 2025-04-06

## 2025-06-30 RX ORDER — BETAMETHASONE DIPROPIONATE 0.5 MG/G
1 OINTMENT TOPICAL 2 TIMES DAILY
COMMUNITY
Start: 2025-06-20

## 2025-06-30 ASSESSMENT — PAIN SCALES - GENERAL: PAINLEVEL_OUTOF10: 0-NO PAIN

## 2025-06-30 ASSESSMENT — ENCOUNTER SYMPTOMS
NAUSEA: 0
ABDOMINAL PAIN: 0
FREQUENCY: 0
SLEEP DISTURBANCE: 0
VOMITING: 0
FATIGUE: 0
HEMATURIA: 0
EYE PAIN: 0
JOINT SWELLING: 0
FEVER: 0
PALPITATIONS: 0
POLYPHAGIA: 0
SHORTNESS OF BREATH: 0
MYALGIAS: 0
CHOKING: 0
POLYDIPSIA: 0
CONFUSION: 0
CONSTIPATION: 0
TREMORS: 0
HEADACHES: 0
WHEEZING: 0
DIARRHEA: 0
FACIAL SWELLING: 0
EYE DISCHARGE: 0
WEAKNESS: 0
NERVOUS/ANXIOUS: 0
NUMBNESS: 0
CHEST TIGHTNESS: 0
DIZZINESS: 0
ANAL BLEEDING: 0
DIFFICULTY URINATING: 0
CHILLS: 0
APPETITE CHANGE: 0
ARTHRALGIAS: 0
COLOR CHANGE: 0
ABDOMINAL DISTENTION: 0
SORE THROAT: 0
COUGH: 0

## 2025-06-30 NOTE — PROGRESS NOTES
"Subjective   Patient ID: Eliceo Murphy is a 80 y.o. male  with medical history of hypertension, hyperlipidemia, gastroesophageal reflux disease, diabetes type 2, postherpetic neuralgia, history of colon CA 2013, S/p colectomy, history of CVA with right sided hemiparesis, dysarthria, asthma, COPD who presents for Wound Care (Right Shin/Onset:  couple days ago).    HPI the patient is presented with his  with complaints of a wound on the anterior right leg.  Stated that he moved ottoman with his right leg and scratched the skin. Now he has one big and 2 small wounds.  He reports swelling, redness and warmth of his leg.  He denies fever or chills.    Review of Systems   Constitutional:  Negative for appetite change, chills, fatigue and fever.   HENT:  Negative for congestion, ear pain, facial swelling, hearing loss, nosebleeds and sore throat.    Eyes:  Negative for pain, discharge and visual disturbance.   Respiratory:  Negative for cough, choking, chest tightness, shortness of breath and wheezing.    Cardiovascular:  Negative for chest pain, palpitations and leg swelling.   Gastrointestinal:  Negative for abdominal distention, abdominal pain, anal bleeding, constipation, diarrhea, nausea and vomiting.   Endocrine: Negative for cold intolerance, heat intolerance, polydipsia, polyphagia and polyuria.   Genitourinary:  Negative for difficulty urinating, frequency, hematuria and urgency.   Musculoskeletal:  Negative for arthralgias, gait problem, joint swelling and myalgias.   Skin:  Negative for color change and rash.        Wound of the right anterior leg   Neurological:  Negative for dizziness, tremors, syncope, weakness, numbness and headaches.   Psychiatric/Behavioral:  Negative for behavioral problems, confusion, sleep disturbance and suicidal ideas. The patient is not nervous/anxious.        Objective   /70   Temp 36.3 °C (97.3 °F) (Temporal)   Ht (!) 1.549 m (5' 1\")   Wt 76.2 kg (168 lb)   " BMI 31.74 kg/m²     Physical Exam  Constitutional:       General: He is not in acute distress.     Appearance: Normal appearance.   HENT:      Head: Normocephalic and atraumatic.      Nose: Nose normal.   Eyes:      Extraocular Movements: Extraocular movements intact.      Conjunctiva/sclera: Conjunctivae normal.      Pupils: Pupils are equal, round, and reactive to light.   Cardiovascular:      Rate and Rhythm: Normal rate and regular rhythm.      Pulses: Normal pulses.      Heart sounds: Normal heart sounds.   Pulmonary:      Effort: Pulmonary effort is normal.      Breath sounds: Normal breath sounds.   Abdominal:      General: Bowel sounds are normal.      Palpations: Abdomen is soft.   Musculoskeletal:         General: Normal range of motion.      Cervical back: Normal range of motion and neck supple.   Skin:     Comments: 1 superficial wound approximately 1 x 1 cm on the anterior right leg and 2 small superficial wounds above it   Neurological:      General: No focal deficit present.      Mental Status: He is alert and oriented to person, place, and time.   Psychiatric:         Mood and Affect: Mood normal.         Behavior: Behavior normal.         Thought Content: Thought content normal.         Judgment: Judgment normal.         Assessment/Plan     wound of the right anterior leg with cellulitis   Wound debridement in office  We have cleaned the wound today, with Betadine, and gauze, removed all slough material  Applied bacitracin and a noninvasive Xeroform patch, and cover it with self adhesive tape   The wound looks clean.  wash it every day with soap and water, dry wound  Then apply bacitracin and a Xeroform gauze   Apply a 2 x 2 or a 4 x 4 gauze and wrapped with a self adhesive tape  All supplies were given   Start doxycycline 100 mg twice daily for 10 days  If redness increases, there is fever or chills, there is pain and discomfort, or significant swelling to lower extremity, go to the emergency room  at once.  Follow-up in a week with PCP.

## 2025-07-07 ENCOUNTER — APPOINTMENT (OUTPATIENT)
Dept: PRIMARY CARE | Facility: CLINIC | Age: 80
End: 2025-07-07
Payer: MEDICARE

## 2025-07-07 DIAGNOSIS — L03.115 CELLULITIS OF RIGHT LOWER EXTREMITY: Primary | ICD-10-CM

## 2025-07-07 PROCEDURE — G2211 COMPLEX E/M VISIT ADD ON: HCPCS | Performed by: INTERNAL MEDICINE

## 2025-07-07 PROCEDURE — 1159F MED LIST DOCD IN RCRD: CPT | Performed by: INTERNAL MEDICINE

## 2025-07-07 PROCEDURE — 1160F RVW MEDS BY RX/DR IN RCRD: CPT | Performed by: INTERNAL MEDICINE

## 2025-07-07 PROCEDURE — 1036F TOBACCO NON-USER: CPT | Performed by: INTERNAL MEDICINE

## 2025-07-07 PROCEDURE — 99212 OFFICE O/P EST SF 10 MIN: CPT | Performed by: INTERNAL MEDICINE

## 2025-07-07 PROCEDURE — 1125F AMNT PAIN NOTED PAIN PRSNT: CPT | Performed by: INTERNAL MEDICINE

## 2025-07-07 RX ORDER — MUPIROCIN 20 MG/G
OINTMENT TOPICAL 3 TIMES DAILY
Qty: 22 G | Refills: 2 | Status: SHIPPED | OUTPATIENT
Start: 2025-07-07 | End: 2025-07-17

## 2025-07-07 ASSESSMENT — PAIN SCALES - GENERAL: PAINLEVEL_OUTOF10: 3

## 2025-07-07 NOTE — PROGRESS NOTES
Subjective   Patient ID: Eliceo Murphy is a 80 y.o. male who presents for follow up of his R leg wound.    HPI     Patient ID: Eliceo Murphy is a 80 y.o. male  with medical history of hypertension, hyperlipidemia, gastroesophageal reflux disease, diabetes type 2, postherpetic neuralgia, history of colon CA 2013, S/p colectomy, history of CVA with right sided hemiparesis, dysarthria, asthma, COPD who presents for Wound Care (Right Shin/Onset:  couple days ago).     Review of Systems   Skin:  Positive for wound.       Objective   /76   Pulse 64   Temp 36.3 °C (97.3 °F) (Temporal)   Resp 16   Wt 74.8 kg (165 lb)   BMI 31.18 kg/m²     Physical Exam  HENT:      Mouth/Throat:      Mouth: Mucous membranes are moist.   Pulmonary:      Breath sounds: Normal breath sounds.   Abdominal:      Palpations: Abdomen is soft.   Skin:     Findings: Erythema present.   Neurological:      Mental Status: Mental status is at baseline.   Psychiatric:         Mood and Affect: Mood normal.         Assessment/Plan   Problem List Items Addressed This Visit           ICD-10-CM    Cellulitis of right lower extremity - Primary L03.115    Relevant Medications    mupirocin (Bactroban) 2 % ointment     RLE wound.  Improved.  Wound debridement in office was done.  We have cleaned the wound today, with Betadine, and gauze, removed all slough material  Applied bacitracin and a noninvasive Xeroform patch, and cover it with self adhesive tape      wash it every day with soap and water, dry wound  Then apply bacitracin and a Xeroform gauze   Apply a 2 x 2 or a 4 x 4 gauze and wrapped with a self adhesive tape  All supplies were given   Continue with doxycycline 100 mg twice daily .     Return in 1 week.

## 2025-07-08 VITALS
BODY MASS INDEX: 31.18 KG/M2 | SYSTOLIC BLOOD PRESSURE: 118 MMHG | DIASTOLIC BLOOD PRESSURE: 76 MMHG | HEART RATE: 64 BPM | RESPIRATION RATE: 16 BRPM | TEMPERATURE: 97.3 F | WEIGHT: 165 LBS

## 2025-07-08 ASSESSMENT — ENCOUNTER SYMPTOMS: WOUND: 1

## 2025-07-14 ENCOUNTER — APPOINTMENT (OUTPATIENT)
Dept: PRIMARY CARE | Facility: CLINIC | Age: 80
End: 2025-07-14
Payer: MEDICARE

## 2025-07-15 ENCOUNTER — OFFICE VISIT (OUTPATIENT)
Dept: PRIMARY CARE | Facility: CLINIC | Age: 80
End: 2025-07-15
Payer: MEDICARE

## 2025-07-15 VITALS
RESPIRATION RATE: 16 BRPM | BODY MASS INDEX: 31.37 KG/M2 | DIASTOLIC BLOOD PRESSURE: 62 MMHG | WEIGHT: 166 LBS | HEART RATE: 64 BPM | SYSTOLIC BLOOD PRESSURE: 102 MMHG | TEMPERATURE: 97.4 F

## 2025-07-15 DIAGNOSIS — L03.115 CELLULITIS OF RIGHT LOWER EXTREMITY: ICD-10-CM

## 2025-07-15 DIAGNOSIS — I83.893 VARICOSE VEINS OF BOTH LEGS WITH EDEMA: Primary | ICD-10-CM

## 2025-07-15 DIAGNOSIS — E78.5 HYPERLIPIDEMIA, UNSPECIFIED HYPERLIPIDEMIA TYPE: ICD-10-CM

## 2025-07-15 PROCEDURE — 1126F AMNT PAIN NOTED NONE PRSNT: CPT | Performed by: INTERNAL MEDICINE

## 2025-07-15 PROCEDURE — 1160F RVW MEDS BY RX/DR IN RCRD: CPT | Performed by: INTERNAL MEDICINE

## 2025-07-15 PROCEDURE — 99213 OFFICE O/P EST LOW 20 MIN: CPT | Performed by: INTERNAL MEDICINE

## 2025-07-15 PROCEDURE — 1036F TOBACCO NON-USER: CPT | Performed by: INTERNAL MEDICINE

## 2025-07-15 PROCEDURE — G2211 COMPLEX E/M VISIT ADD ON: HCPCS | Performed by: INTERNAL MEDICINE

## 2025-07-15 PROCEDURE — 1159F MED LIST DOCD IN RCRD: CPT | Performed by: INTERNAL MEDICINE

## 2025-07-15 ASSESSMENT — ENCOUNTER SYMPTOMS
ACTIVITY CHANGE: 1
WOUND: 1
FATIGUE: 1
ARTHRALGIAS: 1
SLEEP DISTURBANCE: 1
BACK PAIN: 1

## 2025-07-15 ASSESSMENT — PAIN SCALES - GENERAL: PAINLEVEL_OUTOF10: 0-NO PAIN

## 2025-07-15 NOTE — PROGRESS NOTES
Subjective   Patient ID: Eliceo Murphy is a 80 y.o. male who  is scented for follow-up of right leg wound.  Healed.  Skin is intact now.  Using cane, having unsteady gait.    HPI     Patient ID: Eliceo Murphy is a 80 y.o. male with medical history of hypertension, hyperlipidemia, gastroesophageal reflux disease, diabetes type 2, postherpetic neuralgia, history of colon CA 2013, S/p colectomy, history of CVA with right sided hemiparesis, dysarthria, asthma, COPD, s/p R LLE wound.      Review of Systems   Constitutional:  Positive for activity change and fatigue.   Musculoskeletal:  Positive for arthralgias, back pain and gait problem.   Skin:  Positive for wound.   Psychiatric/Behavioral:  Positive for sleep disturbance.        Objective   /62   Pulse 64   Temp 36.3 °C (97.4 °F) (Temporal)   Resp 16   Wt 75.3 kg (166 lb)   BMI 31.37 kg/m²     Physical Exam  HENT:      Head: Normocephalic.      Nose: Nose normal.   Eyes:      Pupils: Pupils are equal, round, and reactive to light.   Cardiovascular:      Rate and Rhythm: Normal rate and regular rhythm.   Pulmonary:      Breath sounds: Normal breath sounds.   Abdominal:      Palpations: Abdomen is soft.   Musculoskeletal:         General: Tenderness present.   Skin:     General: Skin is warm.      Findings: Erythema present. No rash.   Neurological:      Mental Status: He is alert. Mental status is at baseline.   Psychiatric:         Mood and Affect: Mood normal.         Assessment/Plan   Problem List Items Addressed This Visit           ICD-10-CM    Cellulitis of right lower extremity L03.115     Other Visit Diagnoses         Codes      Varicose veins of both legs with edema    -  Primary I83.893      Hyperlipidemia, unspecified hyperlipidemia type     E78.5             RLE wound.   Healed.   Completed antibiotics, local wound care was done      Diabetes type 2.  Not well-controlled.  Hemoglobin A1c 7.3  done 5/8 2025.  Keep 1800 ADA low-sodium low-cholesterol  diet.  Taking glimepiride,  off metformin.  Add farxiga  5 mg once daily.   Exercise, diet, weight loss.     Hypothyroidism.  Continue with Synthroid daily.  Please, take 30 minutes before breakfast levothyroxine 50 mcg.     - HTN.  Well controlled.  Continue to take Current medications.  Avoid salt, exercise.     -Hyperlipidemia.  Continue to take atorvastatin 40 mg daily.  Keep Mediterranean diet, exercise.  Total cholesterol 139, LDL is 73 5/8/2025.      Diarrhea episodes.   Resolved now, off metformin.      -BPH.  Continue to take finasteride, tamsulosin.  Follow-up with urology.     -History of metastatic colon CA 2013.  Patient had surgery done at [facility].  Will obtain all records.  In need to repeat colonoscopy.  CEA is today.     -Health maintenance.  Medicare wellness annual exam is up to date.  All vaccination is up-to-date.  Colonoscopy record is pending.     - S/p CVA with right-sided weakness, dysphagia.  Patient had slow recovery.  Encourage ambulation, exercise.     - Class 1 Obesity with  31. 37   Diet, exercise.  Ideal BMI is less than 25.   Added 10 lbs/

## 2025-07-28 ENCOUNTER — TELEPHONE (OUTPATIENT)
Dept: PRIMARY CARE | Facility: CLINIC | Age: 80
End: 2025-07-28
Payer: MEDICARE

## 2025-07-28 DIAGNOSIS — R05.9 COUGH, UNSPECIFIED TYPE: ICD-10-CM

## 2025-07-28 DIAGNOSIS — J44.9 CHRONIC OBSTRUCTIVE PULMONARY DISEASE, UNSPECIFIED COPD TYPE (MULTI): Primary | ICD-10-CM

## 2025-07-28 RX ORDER — BUDESONIDE, GLYCOPYRROLATE, AND FORMOTEROL FUMARATE 160; 9; 4.8 UG/1; UG/1; UG/1
2 AEROSOL, METERED RESPIRATORY (INHALATION) 2 TIMES DAILY
Qty: 16 G | Refills: 1 | Status: SHIPPED | OUTPATIENT
Start: 2025-07-28

## 2025-07-28 NOTE — TELEPHONE ENCOUNTER
Patient daughter had started to have wheezes.  Take respiratory therapy.  Use ventolin every 6 hours.  Take Symbicort.

## 2025-08-04 ENCOUNTER — HOSPITAL ENCOUNTER (OUTPATIENT)
Dept: RADIOLOGY | Facility: CLINIC | Age: 80
Discharge: HOME | End: 2025-08-04
Payer: MEDICARE

## 2025-08-04 ENCOUNTER — TELEPHONE (OUTPATIENT)
Dept: PRIMARY CARE | Facility: CLINIC | Age: 80
End: 2025-08-04

## 2025-08-04 ENCOUNTER — OFFICE VISIT (OUTPATIENT)
Dept: PRIMARY CARE | Facility: CLINIC | Age: 80
End: 2025-08-04
Payer: MEDICARE

## 2025-08-04 DIAGNOSIS — I10 HYPERTENSION, UNSPECIFIED TYPE: ICD-10-CM

## 2025-08-04 DIAGNOSIS — E11.9 DIABETES TYPE 2, NO OCULAR INVOLVEMENT: ICD-10-CM

## 2025-08-04 DIAGNOSIS — R05.9 COUGH, UNSPECIFIED TYPE: ICD-10-CM

## 2025-08-04 DIAGNOSIS — R05.9 COUGH, UNSPECIFIED TYPE: Primary | ICD-10-CM

## 2025-08-04 DIAGNOSIS — I10 PRIMARY HYPERTENSION: ICD-10-CM

## 2025-08-04 PROCEDURE — 99212 OFFICE O/P EST SF 10 MIN: CPT | Performed by: INTERNAL MEDICINE

## 2025-08-04 PROCEDURE — 1126F AMNT PAIN NOTED NONE PRSNT: CPT | Performed by: INTERNAL MEDICINE

## 2025-08-04 PROCEDURE — 3074F SYST BP LT 130 MM HG: CPT | Performed by: INTERNAL MEDICINE

## 2025-08-04 PROCEDURE — 1159F MED LIST DOCD IN RCRD: CPT | Performed by: INTERNAL MEDICINE

## 2025-08-04 PROCEDURE — 1036F TOBACCO NON-USER: CPT | Performed by: INTERNAL MEDICINE

## 2025-08-04 PROCEDURE — 71046 X-RAY EXAM CHEST 2 VIEWS: CPT

## 2025-08-04 PROCEDURE — 3078F DIAST BP <80 MM HG: CPT | Performed by: INTERNAL MEDICINE

## 2025-08-04 PROCEDURE — 71046 X-RAY EXAM CHEST 2 VIEWS: CPT | Performed by: RADIOLOGY

## 2025-08-04 PROCEDURE — 1160F RVW MEDS BY RX/DR IN RCRD: CPT | Performed by: INTERNAL MEDICINE

## 2025-08-04 PROCEDURE — G2211 COMPLEX E/M VISIT ADD ON: HCPCS | Performed by: INTERNAL MEDICINE

## 2025-08-04 RX ORDER — METOPROLOL TARTRATE 25 MG/1
25 TABLET, FILM COATED ORAL 2 TIMES DAILY
Qty: 180 TABLET | Refills: 3 | Status: SHIPPED | OUTPATIENT
Start: 2025-08-04 | End: 2025-11-02

## 2025-08-04 RX ORDER — PREDNISONE 20 MG/1
TABLET ORAL
Qty: 20 TABLET | Refills: 0 | Status: SHIPPED | OUTPATIENT
Start: 2025-08-04

## 2025-08-04 RX ORDER — GLIMEPIRIDE 4 MG/1
4 TABLET ORAL 2 TIMES DAILY
Qty: 180 TABLET | Refills: 2 | Status: SHIPPED | OUTPATIENT
Start: 2025-08-04 | End: 2025-11-02

## 2025-08-04 RX ORDER — AMOXICILLIN AND CLAVULANATE POTASSIUM 875; 125 MG/1; MG/1
875 TABLET, FILM COATED ORAL 2 TIMES DAILY
Qty: 14 TABLET | Refills: 0 | Status: SHIPPED | OUTPATIENT
Start: 2025-08-04 | End: 2025-08-11

## 2025-08-04 RX ORDER — VALSARTAN 80 MG/1
80 TABLET ORAL DAILY
Qty: 90 TABLET | Refills: 2 | Status: SHIPPED | OUTPATIENT
Start: 2025-08-04 | End: 2025-11-02

## 2025-08-04 ASSESSMENT — PAIN SCALES - GENERAL: PAINLEVEL_OUTOF10: 0-NO PAIN

## 2025-08-04 NOTE — PROGRESS NOTES
Subjective   Patient ID: Eliceo Murphy is a 80 y.o. male who presents with chief complaint of cough.  Has been having wheezing.  No fever or chills.    HPI     Patient ID: Eliceo Murphy is a 80 y.o. male with medical history of hypertension, hyperlipidemia, gastroesophageal reflux disease, diabetes type 2, postherpetic neuralgia, history of colon CA 2013, S/p colectomy, history of CVA with right sided hemiparesis, dysarthria, asthma, COPD, s/p R LLE wound.    Patient has been sick for more than 1 week.  Having cough, nonstop.  Wheezing.  Using respiratory therapy.  No fever or chills.  Weak.     Review of Systems   Constitutional:  Positive for activity change and fatigue.   Respiratory:  Positive for cough and wheezing. Negative for chest tightness.    Musculoskeletal:  Positive for arthralgias and back pain.       Objective   /62   Pulse 64   Temp 36.3 °C (97.3 °F) (Temporal)   Resp 16   Wt 75.3 kg (166 lb)   BMI 31.37 kg/m²     Physical Exam  HENT:      Head: Normocephalic.     Cardiovascular:      Rate and Rhythm: Normal rate and regular rhythm.      Heart sounds: Normal heart sounds.   Pulmonary:      Effort: Pulmonary effort is normal.      Breath sounds: Wheezing present.   Abdominal:      Palpations: Abdomen is soft.     Skin:     General: Skin is warm.     Neurological:      Mental Status: Mental status is at baseline.     Psychiatric:         Mood and Affect: Mood normal.         Assessment/Plan   Problem List Items Addressed This Visit    None  Visit Diagnoses         Codes      Cough, unspecified type    -  Primary R05.9    Relevant Medications    amoxicillin-clavulanate (Augmentin) 875-125 mg tablet    predniSONE (Deltasone) 20 mg tablet    Other Relevant Orders    XR chest 2 views (Completed)      Primary hypertension     I10    Relevant Medications    valsartan (Diovan) 80 mg tablet    metoprolol tartrate (Lopressor) 25 mg tablet      Hypertension, unspecified type     I10    Relevant  Medications    valsartan (Diovan) 80 mg tablet    metoprolol tartrate (Lopressor) 25 mg tablet      Diabetes type 2, no ocular involvement     E11.9    Relevant Medications    valsartan (Diovan) 80 mg tablet    glimepiride (Amaryl) 4 mg tablet          Asthma exacerbation.  COPD exacerbation.  Cough.  Take steroids.  Chest x-ray.  Augmentin 875 mg twice a day.      Diabetes type 2.  Not well-controlled.  Hemoglobin A1c 7.3  done 5/8 2025.  Keep 1800 ADA low-sodium low-cholesterol diet.  Taking glimepiride,  off metformin.  Add farxiga  5 mg once daily.   Exercise, diet, weight loss.     Hypothyroidism.  Continue with Synthroid daily.  Please, take 30 minutes before breakfast levothyroxine 50 mcg.     - HTN.  Well controlled.  Continue to take Current medications.  Avoid salt, exercise.     -Hyperlipidemia.  Continue to take atorvastatin 40 mg daily.  Keep Mediterranean diet, exercise.  Total cholesterol 139, LDL is 73 5/8/2025.     RLE wound.   Healed.   Completed antibiotics, local wound care was done     Diarrhea episodes.   Resolved now, off metformin.      -BPH.  Continue to take finasteride, tamsulosin.  Follow-up with urology.     -History of metastatic colon CA 2013.  Patient had surgery done at [facility].  Will obtain all records.  In need to repeat colonoscopy.  CEA is today.     -Health maintenance.  Medicare wellness annual exam is up to date.  All vaccination is up-to-date.  Colonoscopy record is pending.     - S/p CVA with right-sided weakness, dysphagia.  Patient had slow recovery.  Encourage ambulation, exercise.     - Class 1 Obesity with  31. 37   Diet, exercise.  Ideal BMI is less than 25.   Added 10 lbs/

## 2025-08-05 ENCOUNTER — TELEPHONE (OUTPATIENT)
Dept: PRIMARY CARE | Facility: CLINIC | Age: 80
End: 2025-08-05
Payer: MEDICARE

## 2025-08-05 VITALS
DIASTOLIC BLOOD PRESSURE: 62 MMHG | HEART RATE: 64 BPM | BODY MASS INDEX: 31.37 KG/M2 | SYSTOLIC BLOOD PRESSURE: 112 MMHG | TEMPERATURE: 97.3 F | WEIGHT: 166 LBS | RESPIRATION RATE: 16 BRPM

## 2025-08-05 ASSESSMENT — ENCOUNTER SYMPTOMS
ARTHRALGIAS: 1
ACTIVITY CHANGE: 1
FATIGUE: 1
CHEST TIGHTNESS: 0
COUGH: 1
WHEEZING: 1
BACK PAIN: 1

## 2025-08-12 ENCOUNTER — APPOINTMENT (OUTPATIENT)
Dept: PRIMARY CARE | Facility: CLINIC | Age: 80
End: 2025-08-12
Payer: MEDICARE

## 2025-08-12 VITALS — WEIGHT: 154 LBS | TEMPERATURE: 97.2 F | BODY MASS INDEX: 29.1 KG/M2

## 2025-08-12 DIAGNOSIS — E11.51 TYPE 2 DIABETES MELLITUS WITH DIABETIC PERIPHERAL ANGIOPATHY WITHOUT GANGRENE, WITHOUT LONG-TERM CURRENT USE OF INSULIN (MULTI): ICD-10-CM

## 2025-08-12 DIAGNOSIS — R53.83 MALAISE AND FATIGUE: ICD-10-CM

## 2025-08-12 DIAGNOSIS — J18.9 PNEUMONIA DUE TO INFECTIOUS ORGANISM, UNSPECIFIED LATERALITY, UNSPECIFIED PART OF LUNG: ICD-10-CM

## 2025-08-12 DIAGNOSIS — R53.81 MALAISE AND FATIGUE: ICD-10-CM

## 2025-08-12 DIAGNOSIS — M54.50 LOW BACK PAIN, UNSPECIFIED BACK PAIN LATERALITY, UNSPECIFIED CHRONICITY, UNSPECIFIED WHETHER SCIATICA PRESENT: ICD-10-CM

## 2025-08-12 DIAGNOSIS — R05.9 COUGH, UNSPECIFIED TYPE: Primary | ICD-10-CM

## 2025-08-12 PROCEDURE — G2211 COMPLEX E/M VISIT ADD ON: HCPCS | Performed by: INTERNAL MEDICINE

## 2025-08-12 PROCEDURE — 1126F AMNT PAIN NOTED NONE PRSNT: CPT | Performed by: INTERNAL MEDICINE

## 2025-08-12 PROCEDURE — 1159F MED LIST DOCD IN RCRD: CPT | Performed by: INTERNAL MEDICINE

## 2025-08-12 PROCEDURE — 1160F RVW MEDS BY RX/DR IN RCRD: CPT | Performed by: INTERNAL MEDICINE

## 2025-08-12 PROCEDURE — 99212 OFFICE O/P EST SF 10 MIN: CPT | Performed by: INTERNAL MEDICINE

## 2025-08-12 ASSESSMENT — PAIN SCALES - GENERAL: PAINLEVEL_OUTOF10: 0-NO PAIN

## 2025-08-13 ASSESSMENT — ENCOUNTER SYMPTOMS
ARTHRALGIAS: 1
COUGH: 0
BACK PAIN: 1
PALPITATIONS: 0
SHORTNESS OF BREATH: 0
WHEEZING: 0

## 2025-09-15 ENCOUNTER — APPOINTMENT (OUTPATIENT)
Dept: PRIMARY CARE | Facility: CLINIC | Age: 80
End: 2025-09-15
Payer: MEDICARE

## 2025-09-29 ENCOUNTER — APPOINTMENT (OUTPATIENT)
Dept: PRIMARY CARE | Facility: CLINIC | Age: 80
End: 2025-09-29
Payer: MEDICARE

## 2025-10-10 ENCOUNTER — APPOINTMENT (OUTPATIENT)
Dept: PRIMARY CARE | Facility: CLINIC | Age: 80
End: 2025-10-10
Payer: MEDICARE

## 2026-02-24 ENCOUNTER — APPOINTMENT (OUTPATIENT)
Dept: PRIMARY CARE | Facility: CLINIC | Age: 81
End: 2026-02-24
Payer: MEDICARE